# Patient Record
Sex: FEMALE | Race: BLACK OR AFRICAN AMERICAN | NOT HISPANIC OR LATINO | Employment: FULL TIME | URBAN - METROPOLITAN AREA
[De-identification: names, ages, dates, MRNs, and addresses within clinical notes are randomized per-mention and may not be internally consistent; named-entity substitution may affect disease eponyms.]

---

## 2021-08-12 ENCOUNTER — OFFICE VISIT (OUTPATIENT)
Dept: URGENT CARE | Facility: URGENT CARE | Age: 31
End: 2021-08-12
Payer: COMMERCIAL

## 2021-08-12 VITALS
DIASTOLIC BLOOD PRESSURE: 74 MMHG | RESPIRATION RATE: 20 BRPM | HEART RATE: 89 BPM | OXYGEN SATURATION: 97 % | SYSTOLIC BLOOD PRESSURE: 114 MMHG | TEMPERATURE: 98.1 F | WEIGHT: 160 LBS

## 2021-08-12 DIAGNOSIS — Z20.822 EXPOSURE TO COVID-19 VIRUS: ICD-10-CM

## 2021-08-12 DIAGNOSIS — J06.9 UPPER RESPIRATORY TRACT INFECTION, UNSPECIFIED TYPE: Primary | ICD-10-CM

## 2021-08-12 PROCEDURE — U0005 INFEC AGEN DETEC AMPLI PROBE: HCPCS | Performed by: PHYSICIAN ASSISTANT

## 2021-08-12 PROCEDURE — 99203 OFFICE O/P NEW LOW 30 MIN: CPT | Performed by: PHYSICIAN ASSISTANT

## 2021-08-12 PROCEDURE — U0003 INFECTIOUS AGENT DETECTION BY NUCLEIC ACID (DNA OR RNA); SEVERE ACUTE RESPIRATORY SYNDROME CORONAVIRUS 2 (SARS-COV-2) (CORONAVIRUS DISEASE [COVID-19]), AMPLIFIED PROBE TECHNIQUE, MAKING USE OF HIGH THROUGHPUT TECHNOLOGIES AS DESCRIBED BY CMS-2020-01-R: HCPCS | Performed by: PHYSICIAN ASSISTANT

## 2021-08-12 RX ORDER — BUSPIRONE HYDROCHLORIDE 15 MG/1
15 TABLET ORAL
COMMUNITY
Start: 2020-10-26 | End: 2022-02-03

## 2021-08-12 RX ORDER — DEXTROAMPHETAMINE SULFATE, DEXTROAMPHETAMINE SACCHARATE, AMPHETAMINE SULFATE AND AMPHETAMINE ASPARTATE 5; 5; 5; 5 MG/1; MG/1; MG/1; MG/1
CAPSULE, EXTENDED RELEASE ORAL
COMMUNITY
Start: 2021-07-30

## 2021-08-12 RX ORDER — DEXTROAMPHETAMINE SACCHARATE, AMPHETAMINE ASPARTATE, DEXTROAMPHETAMINE SULFATE AND AMPHETAMINE SULFATE 2.5; 2.5; 2.5; 2.5 MG/1; MG/1; MG/1; MG/1
TABLET ORAL
COMMUNITY
Start: 2020-11-07

## 2021-08-12 ASSESSMENT — ENCOUNTER SYMPTOMS
CARDIOVASCULAR NEGATIVE: 1
COUGH: 0
SORE THROAT: 0
SINUS PAIN: 0
WHEEZING: 0
FEVER: 0
SINUS PRESSURE: 0
CONSTITUTIONAL NEGATIVE: 1
CHILLS: 0
PALPITATIONS: 0
NAUSEA: 1
SHORTNESS OF BREATH: 0
RESPIRATORY NEGATIVE: 1
FATIGUE: 0
RHINORRHEA: 1

## 2021-08-12 ASSESSMENT — PAIN SCALES - GENERAL: PAINLEVEL: NO PAIN (0)

## 2021-08-12 NOTE — PROGRESS NOTES
Jc Sullivan is a 30 year old who presents for the following health issues   HPI   Acute Illness  Acute illness concerns:   Onset/Duration: 2days  Symptoms:  Fever: no  Chills/Sweats: no  Headache (location?): no  Sinus Pressure: no  Conjunctivitis:  no  Ear Pain: no  Rhinorrhea: YES  Congestion: no  Sore Throat: no  Cough: no  Wheeze: no  Decreased Appetite: no  Nausea: YES  Vomiting: no  Diarrhea: no  Dysuria/Freq.: no  Dysuria or Hematuria: no  Fatigue/Achiness: no  Sick/Strep Exposure: YES- exposure to covid from daughter  Therapies tried and outcome: rest, fluids with minimal relief  There is no problem list on file for this patient.    Current Outpatient Medications   Medication     ADDERALL XR 20 MG 24 hr capsule     amphetamine-dextroamphetamine (ADDERALL) 10 MG tablet     busPIRone (BUSPAR) 15 MG tablet     No current facility-administered medications for this visit.        Allergies   Allergen Reactions     Seasonal Allergies        Review of Systems   Constitutional: Negative.  Negative for chills, fatigue and fever.   HENT: Positive for rhinorrhea. Negative for congestion, ear discharge, ear pain, hearing loss, sinus pressure, sinus pain and sore throat.    Respiratory: Negative.  Negative for cough, shortness of breath and wheezing.    Cardiovascular: Negative.  Negative for chest pain, palpitations and peripheral edema.   Gastrointestinal: Positive for nausea.   All other systems reviewed and are negative.           Objective    /74 (BP Location: Right arm, Patient Position: Sitting, Cuff Size: Adult Large)   Pulse 89   Temp 98.1  F (36.7  C) (Oral)   Resp 20   Wt 72.6 kg (160 lb)   LMP 08/02/2021 (Exact Date)   SpO2 97%   There is no height or weight on file to calculate BMI.  Physical Exam  Vitals and nursing note reviewed.   Constitutional:       General: She is not in acute distress.     Appearance: Normal appearance. She is well-developed. She is obese. She is not  ill-appearing.   HENT:      Head: Normocephalic and atraumatic.      Comments: TMs are intact without any erythema or bulging bilaterally.  Airway is patent.     Nose: Nose normal.      Mouth/Throat:      Mouth: Mucous membranes are moist.      Pharynx: Uvula midline. No pharyngeal swelling, oropharyngeal exudate or posterior oropharyngeal erythema.      Tonsils: No tonsillar exudate.   Eyes:      General: No scleral icterus.     Extraocular Movements: Extraocular movements intact.      Conjunctiva/sclera: Conjunctivae normal.      Pupils: Pupils are equal, round, and reactive to light.   Neck:      Thyroid: No thyromegaly.   Cardiovascular:      Rate and Rhythm: Normal rate and regular rhythm.      Pulses: Normal pulses.      Heart sounds: Normal heart sounds, S1 normal and S2 normal. No murmur heard.   No friction rub. No gallop.    Pulmonary:      Effort: Pulmonary effort is normal. No accessory muscle usage, respiratory distress or retractions.      Breath sounds: Normal breath sounds and air entry. No stridor. No decreased breath sounds, wheezing, rhonchi or rales.   Musculoskeletal:      Cervical back: Normal range of motion and neck supple.   Lymphadenopathy:      Cervical: No cervical adenopathy.   Skin:     General: Skin is warm and dry.      Nails: There is no clubbing.   Neurological:      Mental Status: She is alert and oriented to person, place, and time.   Psychiatric:         Mood and Affect: Mood normal.         Behavior: Behavior normal.         Thought Content: Thought content normal.         Judgment: Judgment normal.            Assessment/Plan:  Upper respiratory tract infection, unspecified type: Will test for COVID19.  Tylenol/ibuprofen as needed for pain/fever, rest, fluids, chicken soup.  Recommend self quarantine until it has been at least 10days since onset of symptoms with improvement and until s/he has been fever free for 3days without the use of anti-pyretics pending results.  To the  urgent care/ER if worsening cough, shortness of breath, wheezing, fevers or chest pain  -     Symptomatic COVID-19 Virus (Coronavirus) by PCR Nasopharyngeal    Exposure to COVID-19 virus        Domonique Peng PA-C

## 2021-08-13 LAB — SARS-COV-2 RNA RESP QL NAA+PROBE: NEGATIVE

## 2021-08-15 ENCOUNTER — OFFICE VISIT (OUTPATIENT)
Dept: URGENT CARE | Facility: URGENT CARE | Age: 31
End: 2021-08-15
Payer: COMMERCIAL

## 2021-08-15 VITALS
DIASTOLIC BLOOD PRESSURE: 74 MMHG | SYSTOLIC BLOOD PRESSURE: 114 MMHG | HEART RATE: 78 BPM | RESPIRATION RATE: 16 BRPM | OXYGEN SATURATION: 94 % | TEMPERATURE: 98.2 F

## 2021-08-15 DIAGNOSIS — R05.9 COUGH: Primary | ICD-10-CM

## 2021-08-15 PROCEDURE — U0003 INFECTIOUS AGENT DETECTION BY NUCLEIC ACID (DNA OR RNA); SEVERE ACUTE RESPIRATORY SYNDROME CORONAVIRUS 2 (SARS-COV-2) (CORONAVIRUS DISEASE [COVID-19]), AMPLIFIED PROBE TECHNIQUE, MAKING USE OF HIGH THROUGHPUT TECHNOLOGIES AS DESCRIBED BY CMS-2020-01-R: HCPCS | Performed by: NURSE PRACTITIONER

## 2021-08-15 PROCEDURE — 99214 OFFICE O/P EST MOD 30 MIN: CPT | Performed by: NURSE PRACTITIONER

## 2021-08-15 PROCEDURE — U0005 INFEC AGEN DETEC AMPLI PROBE: HCPCS | Performed by: NURSE PRACTITIONER

## 2021-08-15 ASSESSMENT — ENCOUNTER SYMPTOMS
FATIGUE: 1
DIARRHEA: 0
SHORTNESS OF BREATH: 0
HEADACHES: 0
COUGH: 1
NAUSEA: 0
SORE THROAT: 0
FEVER: 1
CHILLS: 0
VOMITING: 0
RHINORRHEA: 1

## 2021-08-15 ASSESSMENT — PAIN SCALES - GENERAL: PAINLEVEL: MILD PAIN (3)

## 2021-08-15 NOTE — LETTER
[facility] [address] [address]  Phone: [phone]    August 15, 2021        Laurie R Marquis  [address] [place] [id number]          To whom it may concern:    RE: Laurie Marquis    Patient was seen and treated today at our clinic. Please allow her to rest home 8/16/2021. Patient may return to work  with no restrictions.  Please contact me for questions or concerns.      Sincerely,        Snow Ellis  DNP, FNP-BC  Family Nurse Practitoner

## 2021-08-15 NOTE — PATIENT INSTRUCTIONS

## 2021-08-15 NOTE — PROGRESS NOTES
SUBJECTIVE:   Laurie Marquis is a 30 year old female presenting with a chief complaint of   Chief Complaint   Patient presents with     Covid Concern     Daughter was diagnosed on 8/11 positive with covid- patient has a small cough- loss of smell and taste- fatigue- chills- patient was tested for covid and hers came back negative- she would like to be retested.       She is an established patient of [facility].    URI Adult    Onset of symptoms was 3 day(s) ago.  Course of illness is worsening.    Severity moderate  Current and Associated symptoms: fever, runny nose, stuffy nose, cough sore throat, fatigue and loss of taste and smell  Treatment measures tried include None tried.  Predisposing factors include exposed to COVID        Review of Systems   Constitutional: Positive for fatigue and fever. Negative for chills.   HENT: Positive for congestion and rhinorrhea. Negative for ear pain and sore throat.         Loss of taste and smell   Respiratory: Positive for cough. Negative for shortness of breath.    Gastrointestinal: Negative for diarrhea, nausea and vomiting.   Neurological: Negative for headaches.   All other systems reviewed and are negative.      No past medical history on file.  No family history on file.  Current Outpatient Medications   Medication Sig Dispense Refill     ADDERALL XR 20 MG 24 hr capsule TAKE 1 CAPSULE BY MOUTH ONCE DAILY ALONG WITH 10MG REGULAR TABLET       busPIRone (BUSPAR) 15 MG tablet Take 15 mg by mouth       amphetamine-dextroamphetamine (ADDERALL) 10 MG tablet TAKE 1 TABLET BY MOUTH ONCE DAILY IN THE MORNING ALONG WITH 20 MG ER FORM       Social History     Tobacco Use     Smoking status: Never Smoker     Smokeless tobacco: Never Used   Substance Use Topics     Alcohol use: Yes       OBJECTIVE  /74   Pulse 78   Temp 98.2  F (36.8  C) (Tympanic)   Resp 16   LMP 08/02/2021 (Exact Date)   SpO2 94%     Physical Exam  Vitals and nursing note reviewed.   Constitutional:        General: She is not in acute distress.     Appearance: She is well-developed. She is not diaphoretic.   HENT:      Head: Normocephalic and atraumatic.      Right Ear: Tympanic membrane and external ear normal.      Left Ear: Tympanic membrane and external ear normal.   Eyes:      Pupils: Pupils are equal, round, and reactive to light.   Pulmonary:      Effort: Pulmonary effort is normal. No respiratory distress.      Breath sounds: Normal breath sounds.   Musculoskeletal:      Cervical back: Normal range of motion and neck supple.   Lymphadenopathy:      Cervical: No cervical adenopathy.   Skin:     General: Skin is warm and dry.   Neurological:      Mental Status: She is alert.      Cranial Nerves: No cranial nerve deficit.       ASSESSMENT:      ICD-10-CM    1. Cough  R05 Symptomatic COVID-19 Virus (Coronavirus) by PCR Nasopharyngeal        Medical Decision Making:    Differential Diagnosis:  URI Adult/Peds:  Allergic rhinitis, Pneumonia, Sinusitis, Viral pharyngitis, Viral upper respiratory illness and COVID    PLAN:  Full PPE to include gloves, masks, faceshield, gown and head cap  Laurie Marquis has been evaluated and needs to remain out of work to isolate for 10 days from when symptoms started AND 72 hours after fever resolves (without fever reducing medications) AND improvement of respiratory symptoms (whichever is longer).        Patient Instructions       Patient Education     Viral Upper Respiratory Illness (Adult)    You have a viral upper respiratory illness (URI), which is another term for the common cold. This illness is contagious during the first few days. It is spread through the air by coughing and sneezing. It may also be spread by direct contact (touching the sick person and then touching your own eyes, nose, or mouth). Frequent handwashing will decrease risk of spread. Most viral illnesses go away within 7 to 10 days with rest and simple home remedies. Sometimes the illness may last for several  weeks. Antibiotics will not kill a virus, and they are generally not prescribed for this condition.  Home care    If symptoms are severe, rest at home for the first 2 to 3 days. When you resume activity, don't let yourself get too tired.    Don't smoke. If you need help stopping, talk with your healthcare provider.    Avoid being exposed to cigarette smoke (yours or others ).    You may use acetaminophen or ibuprofen to control pain and fever, unless another medicine was prescribed. If you have chronic liver or kidney disease, have ever had a stomach ulcer or gastrointestinal bleeding, or are taking blood-thinning medicines, talk with your healthcare provider before using these medicines. Aspirin should never be given to anyone under 18 years of age who is ill with a viral infection or fever. It may cause severe liver or brain damage.    Your appetite may be poor, so a light diet is fine. Stay well hydrated by drinking 6 to 8 glasses of fluids per day (water, soft drinks, juices, tea, or soup). Extra fluids will help loosen secretions in the nose and lungs.    Over-the-counter cold medicines will not shorten the length of time you re sick, but they may be helpful for the following symptoms: cough, sore throat, and nasal and sinus congestion. If you take prescription medicines, ask your healthcare provider or pharmacist which over-the-counter medicines are safe to use. (Note: Don't use decongestants if you have high blood pressure.)  Follow-up care  Follow up with your healthcare provider, or as advised.  When to seek medical advice  Call your healthcare provider right away if any of these occur:    Cough with lots of colored sputum (mucus)    Severe headache; face, neck, or ear pain    Difficulty swallowing due to throat pain    Fever of 100.4 F (38 C) or higher, or as directed by your healthcare provider  Call 911  Call 911 if any of these occur:    Chest pain, shortness of breath, wheezing, or difficulty  breathing    Coughing up blood    Very severe pain with swallowing, especially if it goes along with a muffled voice   [employer] last reviewed this educational content on 6/1/2018 2000-2021 [employer]. All rights reserved. This information is not intended as a substitute for professional medical care. Always follow your healthcare professional's instructions.

## 2021-08-16 LAB — SARS-COV-2 RNA RESP QL NAA+PROBE: POSITIVE

## 2021-10-02 ENCOUNTER — HEALTH MAINTENANCE LETTER (OUTPATIENT)
Age: 31
End: 2021-10-02

## 2022-01-18 ENCOUNTER — OFFICE VISIT (OUTPATIENT)
Dept: URGENT CARE | Facility: URGENT CARE | Age: 32
End: 2022-01-18
Payer: COMMERCIAL

## 2022-01-18 VITALS
WEIGHT: 169.6 LBS | SYSTOLIC BLOOD PRESSURE: 105 MMHG | DIASTOLIC BLOOD PRESSURE: 73 MMHG | HEART RATE: 76 BPM | TEMPERATURE: 98.7 F | OXYGEN SATURATION: 98 %

## 2022-01-18 DIAGNOSIS — R07.0 THROAT PAIN: Primary | ICD-10-CM

## 2022-01-18 LAB
DEPRECATED S PYO AG THROAT QL EIA: NEGATIVE
GROUP A STREP BY PCR: NOT DETECTED

## 2022-01-18 PROCEDURE — 99000 SPECIMEN HANDLING OFFICE-LAB: CPT | Performed by: PHYSICIAN ASSISTANT

## 2022-01-18 PROCEDURE — U0003 INFECTIOUS AGENT DETECTION BY NUCLEIC ACID (DNA OR RNA); SEVERE ACUTE RESPIRATORY SYNDROME CORONAVIRUS 2 (SARS-COV-2) (CORONAVIRUS DISEASE [COVID-19]), AMPLIFIED PROBE TECHNIQUE, MAKING USE OF HIGH THROUGHPUT TECHNOLOGIES AS DESCRIBED BY CMS-2020-01-R: HCPCS | Mod: 90 | Performed by: PHYSICIAN ASSISTANT

## 2022-01-18 PROCEDURE — 99213 OFFICE O/P EST LOW 20 MIN: CPT | Performed by: PHYSICIAN ASSISTANT

## 2022-01-18 PROCEDURE — U0005 INFEC AGEN DETEC AMPLI PROBE: HCPCS | Mod: 90 | Performed by: PHYSICIAN ASSISTANT

## 2022-01-18 PROCEDURE — 87651 STREP A DNA AMP PROBE: CPT | Performed by: PHYSICIAN ASSISTANT

## 2022-01-18 NOTE — PROGRESS NOTES
Chief Complaint   Patient presents with     Sick     Having allergy symtoms lately, woke up today, fatigued, sore throat, back pain, chills. Did covid test at home-negative.     Results for orders placed or performed in visit on 01/18/22   Streptococcus A Rapid Screen w/Reflex to PCR - Clinic Collect     Status: Normal    Specimen: Throat; Swab   Result Value Ref Range    Group A Strep antigen Negative Negative         ASSESSMENT:     ICD-10-CM    1. Throat pain  R07.0 Streptococcus A Rapid Screen w/Reflex to PCR - Clinic Collect     Symptomatic; Yes; 1/17/2022 COVID-19 Virus (Coronavirus) by PCR Nose         PLAN:VSS. Covid pending.  I have discussed clinical findings with patient.  Symptomatic care is discussed.  I have discussed the possibility of  worsening symptoms and indication to RTC or go to the ER if they occur.  All questions are answered, patient indicates understanding of these issues and is in agreement with plan.   Patient care instructions are discussed/given at the end of visit.   Lots of rest and fluids.      Ariela Cope PA-C      SUBJECTIVE:  31-year-old female with some allergy symptoms over the last several days but last night acute onset of sore throat, fatigue, back pain, chills.  Had a negative home COVID test.  No vomiting or diarrhea.  No significant cough.      Allergies   Allergen Reactions     Seasonal Allergies        No past medical history on file.    ADDERALL XR 20 MG 24 hr capsule, TAKE 1 CAPSULE BY MOUTH ONCE DAILY ALONG WITH 10MG REGULAR TABLET  amphetamine-dextroamphetamine (ADDERALL) 10 MG tablet, TAKE 1 TABLET BY MOUTH ONCE DAILY IN THE MORNING ALONG WITH 20 MG ER FORM  busPIRone (BUSPAR) 15 MG tablet, Take 15 mg by mouth (Patient not taking: Reported on 1/18/2022)    No current facility-administered medications on file prior to visit.      Social History     Tobacco Use     Smoking status: Never Smoker     Smokeless tobacco: Never Used   Substance Use Topics      Alcohol use: Yes       ROS:  CONSTITUTIONAL: Negative for fatigue or fever.  EYES: Negative for eye problems.  ENT: As above.  RESP: As above.  CV: Negative for chest pains.  GI: Negative for vomiting.  MUSCULOSKELETAL:  Negative for significant muscle or joint pains.  NEUROLOGIC: Negative for headaches.  SKIN: Negative for rash.  PSYCH: Normal mentation for age.    OBJECTIVE:  /73   Pulse 76   Temp 98.7  F (37.1  C) (Tympanic)   Wt 76.9 kg (169 lb 9.6 oz)   SpO2 98%   GENERAL APPEARANCE: Healthy, alert and no distress.  EYES:Conjunctiva/sclera clear.  EARS: No cerumen.   Ear canals w/o erythema.  TM's intact w/o erythema.    NOSE/MOUTH: Nose without ulcers, erythema or lesions.  SINUSES: No maxillary sinus tenderness.  THROAT: Mild  erythema w/o tonsillar enlargement . No exudates.  NECK: Supple, nontender, no lymphadenopathy.  RESP: Lungs clear to auscultation - no rales, rhonchi or wheezes  CV: Regular rate and rhythm, normal S1 S2, no murmur noted.  NEURO: Awake, alert    SKIN: No rashes        Ariela Cope PA-C

## 2022-01-19 LAB
SARS-COV-2 RNA RESP QL NAA+PROBE: NORMAL
SARS-COV-2 RNA RESP QL NAA+PROBE: NOT DETECTED

## 2022-02-03 ENCOUNTER — OFFICE VISIT (OUTPATIENT)
Dept: FAMILY MEDICINE | Facility: CLINIC | Age: 32
End: 2022-02-03
Payer: COMMERCIAL

## 2022-02-03 VITALS
OXYGEN SATURATION: 98 % | HEART RATE: 76 BPM | TEMPERATURE: 97.9 F | SYSTOLIC BLOOD PRESSURE: 118 MMHG | DIASTOLIC BLOOD PRESSURE: 77 MMHG | WEIGHT: 171.2 LBS | RESPIRATION RATE: 16 BRPM

## 2022-02-03 DIAGNOSIS — Z11.59 NEED FOR HEPATITIS C SCREENING TEST: ICD-10-CM

## 2022-02-03 DIAGNOSIS — Z11.4 SCREENING FOR HIV (HUMAN IMMUNODEFICIENCY VIRUS): ICD-10-CM

## 2022-02-03 DIAGNOSIS — Z30.46 ENCOUNTER FOR REMOVAL AND REINSERTION OF NEXPLANON: Primary | ICD-10-CM

## 2022-02-03 PROBLEM — Z98.84 S/P LAPAROSCOPIC SLEEVE GASTRECTOMY: Status: ACTIVE | Noted: 2017-04-26

## 2022-02-03 PROBLEM — L30.1 DYSHIDROTIC ECZEMA: Status: ACTIVE | Noted: 2018-11-23

## 2022-02-03 PROBLEM — K62.5 BRBPR (BRIGHT RED BLOOD PER RECTUM): Status: ACTIVE | Noted: 2018-11-23

## 2022-02-03 PROBLEM — N94.6 DYSMENORRHEA: Status: ACTIVE | Noted: 2018-11-23

## 2022-02-03 PROBLEM — F41.8 DEPRESSION WITH ANXIETY: Status: ACTIVE | Noted: 2018-11-23

## 2022-02-03 PROCEDURE — 99207 PR DROP WITH A PROCEDURE: CPT | Performed by: NURSE PRACTITIONER

## 2022-02-03 PROCEDURE — 11983 REMOVE/INSERT DRUG IMPLANT: CPT | Performed by: NURSE PRACTITIONER

## 2022-02-03 ASSESSMENT — PAIN SCALES - GENERAL: PAINLEVEL: NO PAIN (0)

## 2022-02-03 NOTE — PATIENT INSTRUCTIONS
Patient Education     We placed the Nexplanon today.  Congratulations on choosing such an effective birth control!  -Use condoms for the next 7 days, after that, you are protected against pregnancy.  You should still use condoms to protect against  sexually transmitted infections.  -Remove the gauze dressing tomorrow, you can shower normally at that time.  -The little white bandaids will fall off after about one week.  This is fine.  -There should be minimal scarring; if you have any redness, tenderness, or drainage from the site, call right away.  -Remember to expect irregular bleeding, this is a normal side effect of Nexplanon  -Call with any questions or concerns!    Nexplanon Drug Implant 68 mg  Uses  For birth control.  Instructions  Two bandages will cover the area where the porsha is placed. Leave the larger, outer bandage on for 24 hours. Leave the smaller bandage in place for 3-5 days, as instructed by your doctor.  Keep the bandages clean and dry.  This medicine is normally placed under the skin of the upper arm, usually in the arm you do not write with.  A negative pregnancy test may be needed before receiving this medicine.  A second form of birth control may be needed for the first week after the porsha is placed. Ask your doctor or pharmacist about the need for back-up birth control.  Keep the card that includes the date and place the porsha was inserted.  The porsha must be removed after 3 years.  This medicine may cause dark patches to appear on your face. Avoid sunlight and use sunscreen lotion to minimize further darkening of these skin patches.  Avoid prolonged or excessive sunlight exposure. Use sunscreen lotion with SPF 15 or higher.  Please tell your doctor and pharmacist about all the medicines you take. Include both prescription and over-the-counter medicines. Also tell them about any vitamins, herbal medicines, or anything else you take for your health.  It is very important that you follow your  doctor's instructions for all blood tests.  It is very important that you keep all appointments for medical exams and tests while on this medicine.  Cautions  Some patients taking this medicine have experienced serious side effects. Please speak with your doctor to understand the risks and benefits associated with this medicine.  This medicine is associated with an increased risk for serious blood clots. Speak with your doctor about the benefits and risks from using this medicine.  This medicine is associated with an increased risk of serious heart problems, heart attack, and stroke. Please speak with your doctor about the risks and benefits of using this medicine. Contact your doctor immediately if you experience chest pain or difficulty breathing.  Tell your doctor and pharmacist if you ever had an allergic reaction to a medicine. Symptoms of an allergic reaction can include trouble breathing, skin rash, itching, swelling, or severe dizziness.  This medicine may not work as well in women who are very overweight. Speak to your doctor about any need to reduce weight.  Your ability to stay alert or to react quickly may be impaired by this medicine. Do not drive or operate machinery until you know how this medicine will affect you.  Please check with your doctor before drinking alcohol while on this medicine.  Avoid smoking while on this medicine. Smoking may increase your risk for stroke, heart attack, blood clots, high blood pressure, and other diseases of the heart and blood vessels.  Ask your doctor to show you how to perform a self breast examination. You should check your breasts once a month and report any changes to your doctor.  Talk to your doctor about getting a complete physical exam every year while on this medicine.  Check regularly to make sure you can feel the porsha under the skin. Contact your doctor right away if you can not feel it, or if it feels bent or broken.  Call the doctor if there are any  signs of confusion or unusual changes in behavior.  Tell the doctor or pharmacist if you are pregnant, planning to be pregnant, or breastfeeding.  Do not use this medicine if you are pregnant. If you become pregnant while on this medicine, contact your doctor immediately.  This medicine does not protect you or your partner against sexually transmitted diseases.  Do not take Anton Ruiz's wort while on this medicine.  Ask your pharmacist if this medicine can interact with any of your other medicines. Be sure to tell them about all the medicines you take.  Please tell all your doctors and dentists that you are on this medicine before they provide care.  Do not start or stop any other medicines without first speaking to your doctor or pharmacist.  Seek medical attention if you see any signs of a serious infection. These signs include pain, increasing redness or pus where this medicine is being used.  Side Effects  The following is a list of some common side effects from this medicine. Please speak with your doctor about what you should do if you experience these or other side effects.    acne    bloating    breast pain or swelling    dizziness    hair loss    headaches    high blood pressure    brown colored patches on the face    nausea    stomach upset or abdominal pain    vaginal bleeding or spotting between periods    vaginal itching or discharge    weight gain  Call your doctor or get medical help right away if you notice any of these more serious side effects:    increased risk of a blood clot    chest pain    confusion    coughing up blood or vomit that looks like coffee grounds    depression or feeling sad    fainting    severe or persistent headache    fast or irregular heart beats    jaw pain    sudden leg pain, swelling, warmth or redness    mood changes    shortness of breath    stomach pain    symptoms of stroke (such as one-sided weakness, slurred speech, confusion)    sweating    unusual or unexplained  tiredness or weakness    dark urine    cramping of the uterus or bleeding from the vagina    blurring or changes of vision    pain, heat, swelling or redness at the incision site    yellowing of the eyes    yellowing of the skin  A few people may have an allergic reactions to this medicine. Symptoms can include difficulty breathing, skin rash, itching, swelling, or severe dizziness. If you notice any of these symptoms, seek medical help quickly.  Extra  Please speak with your doctor, nurse, or pharmacist if you have any questions about this medicine.  [email or web address]  IMPORTANT NOTE: This document tells you briefly how to take your medicine, but it does not tell you all there is to know about it.Your doctor or pharmacist may give you other documents about your medicine. Please talk to them if you have any questions.Always follow their advice. There is a more complete description of this medicine available in English.Scan this code on your smartphone or tablet or use the web address below. You can also ask your pharmacist for a printout. If you have any questions, please ask your pharmacist.     2021 [employer].

## 2022-02-03 NOTE — PROGRESS NOTES
Assessment & Plan     Encounter for removal and reinsertion of Nexplanon  Procedure Note - Etonogestrel Implant Replacement    HPI: Laurie Marquis is here for Nexplanon (etonogestrel implant) replacement  Indication: contraception  STI history:  none      Counselling and Consent:  Affirmation of informed consent was signed and scanned into the medical record. Risks, benefits and alternatives were discussed.   Instructed on use of condoms for STI prevention.  Patient's questions were elicited and answered.        Preoperative Diagnosis:  Nexplanon Replacement  Postoperative Diagnosis:  same     Technique:   Skin prep Betadine  Anesthesia 1% lidocaine, with epi  EBL:   minimal  Complications: No  Tolerance:  Pt tolerated procedure well and was in stable condition.     Pt was positioned on exam table with left arm flexed and externally rotated. Nexplanon device was palpated without difficulty.  Area was prepped with betadine.  Anesthesia provided at the insertion site and along insertion tracj, Etonogestrel implant was then removed without difficulty using forceps.  Etonogestrel implant was then inserted subdermally in usual fashion below biceps/triceps sulcus about 8 cm from medial epicondyle . Provider and patient confirmed placement by palpating the device. Pressure dressing applied and procedure complete.     Follow up:  Pt was instructed to call if bleeding, severe pain or foul smell.  Instructed to remove pressure dressing after 24 hours, then may keep insertion site covered with a bandaid until it is healed.  Instructed that she requires removal or replacement of the device in 3 years.      Follow-up as needed.       Screening for HIV (human immunodeficiency virus)    - HIV Antigen Antibody Combo    Need for hepatitis C screening test    - Hepatitis C Screen Reflex to HCV RNA Quant and Genotype      Prescription drug management  22  minutes spent on the date of the encounter doing chart review, history and  exam, documentation and further activities per the note       Work on weight loss  Regular exercise  See Patient Instructions    No follow-ups on file.    DEANA Wu CNP  [facility] BRIAN Sullivan is a 31 year old who presents for the following health issues   Nexplanon removal or replacement    HPI     Patient is here for Nexplanon removal and replacement.  It was placed by [facility] on 11.26/18. LAST MENSTUAL PERIOD 1/31/22. She is [id number], last pap 12/31/19 NIL at [facility].        Review of Systems   Constitutional, HEENT, cardiovascular, pulmonary, gi and gu systems are negative, except as otherwise noted.      Objective    /77 (BP Location: Left arm, Patient Position: Chair, Cuff Size: Adult Regular)   Pulse 76   Temp 97.9  F (36.6  C) (Tympanic)   Resp 16   Wt 77.7 kg (171 lb 3.2 oz)   SpO2 98%   There is no height or weight on file to calculate BMI.  Physical Exam   GENERAL: healthy, alert and no distress  NECK: no adenopathy, no asymmetry, masses, or scars and thyroid normal to palpation  RESP: lungs clear to auscultation - no rales, rhonchi or wheezes  CV: regular rate and rhythm, normal S1 S2, no S3 or S4, no murmur, click or rub, no peripheral edema and peripheral pulses strong  ABDOMEN: soft, nontender, no hepatosplenomegaly, no masses and bowel sounds normal  MS: no gross musculoskeletal defects noted, no edema  SKIN: no suspicious lesions or rashes  NEURO: Normal strength and tone, mentation intact and speech normal  PSYCH: mentation appears normal, affect normal/bright  LYMPH: no cervical adenopathy

## 2022-09-03 ENCOUNTER — HEALTH MAINTENANCE LETTER (OUTPATIENT)
Age: 32
End: 2022-09-03

## 2022-11-19 ENCOUNTER — E-VISIT (OUTPATIENT)
Dept: URGENT CARE | Facility: CLINIC | Age: 32
End: 2022-11-19
Payer: COMMERCIAL

## 2022-11-19 DIAGNOSIS — N76.0 BACTERIAL VAGINOSIS: Primary | ICD-10-CM

## 2022-11-19 DIAGNOSIS — B96.89 BACTERIAL VAGINOSIS: Primary | ICD-10-CM

## 2022-11-19 PROCEDURE — 99421 OL DIG E/M SVC 5-10 MIN: CPT | Performed by: NURSE PRACTITIONER

## 2022-11-19 RX ORDER — METRONIDAZOLE 500 MG/1
500 TABLET ORAL 2 TIMES DAILY
Qty: 14 TABLET | Refills: 0 | Status: SHIPPED | OUTPATIENT
Start: 2022-11-19 | End: 2022-11-26

## 2022-11-19 NOTE — PATIENT INSTRUCTIONS
Thank you for choosing us for your care. I have placed an order for a prescription so that you can start treatment. View your full visit summary for details by clicking on the link below. Your pharmacist will able to address any questions you may have about the medication.     If you re not feeling better within 2-3 days, please schedule an appointment.  You can schedule an appointment right here in [facility], or call [phone]  If the visit is for the same symptoms as your eVisit, we ll refund the cost of your eVisit if seen within seven days.      Bacterial Vaginosis    You have a vaginal infection called bacterial vaginosis (BV). Both good and bad bacteria are present in a healthy vagina. BV occurs when these bacteria get out of balance. The number of bad bacteria increase. And the number of good bacteria decrease. BV is linked with sexual activity, but it's not a sexually transmitted infection (STI).   BV may or may not cause symptoms. If symptoms do occur, they can include:     Thin, gray, milky-white, or sometimes green discharge    Unpleasant odor or  fishy  smell    Itching, burning, or pain in or around the vagina  It is not known what causes BV, but certain factors can make the problem more likely. These can include:     Douching    Spermicides    Use of antibiotics    Change in hormone levels with pregnancy, breastfeeding, or menopause    Having sex with a new partner    Having sex with more than one partner  BV will sometimes go away on its own. But treatment is often advised. This is because untreated BV can raise the risk of more serious health problems such as:     Pelvic inflammatory disease (PID)     delivery (giving birth to a baby early if you re pregnant)    HIV and some other sexually transmitted infections (STIs)    Infection after surgery on the reproductive organs  Home care  General care    BV is most often treated with medicines called antibiotics. These may be given as pills or  as a vaginal cream. If antibiotics are prescribed, be sure to use them exactly as directed. And complete all of the medicine, even if your symptoms go away.    Don't douche or having sex during treatment.    If you have sex with a female partner, ask your healthcare provider if she should also be treated.  Prevention    Don't douche.    Don't have sex. If you do have sex, then take steps to lower your risk:  ? Use condoms when having sex.  ? Limit the number of sex partners you have.    Follow-up care  Follow up with your healthcare provider, or as advised.   When to get medical advice  Call your healthcare provider right away if:     You have a fever of 100.4 F (38 C) or higher, or as directed by your provider.    Your symptoms get worse, or they don t go away within a few days of starting treatment.    You have new pain in the lower belly or pelvic region.    You have side effects that bother you or a reaction to the pills or cream you re prescribed.    You or any of your sex partners have new symptoms, such as a rash, joint pain, or sores.  [employer] last reviewed this educational content on 6/1/2020 2000-2021 [employer]. All rights reserved. This information is not intended as a substitute for professional medical care. Always follow your healthcare professional's instructions.

## 2023-01-15 ENCOUNTER — HEALTH MAINTENANCE LETTER (OUTPATIENT)
Age: 33
End: 2023-01-15

## 2023-05-18 ENCOUNTER — OFFICE VISIT (OUTPATIENT)
Dept: URGENT CARE | Facility: URGENT CARE | Age: 33
End: 2023-05-18
Payer: COMMERCIAL

## 2023-05-18 VITALS
WEIGHT: 183 LBS | TEMPERATURE: 98.2 F | SYSTOLIC BLOOD PRESSURE: 116 MMHG | RESPIRATION RATE: 17 BRPM | HEART RATE: 76 BPM | OXYGEN SATURATION: 98 % | DIASTOLIC BLOOD PRESSURE: 70 MMHG

## 2023-05-18 DIAGNOSIS — H57.9 ALLERGIC EYE REACTION: Primary | ICD-10-CM

## 2023-05-18 PROCEDURE — 99213 OFFICE O/P EST LOW 20 MIN: CPT | Performed by: PHYSICIAN ASSISTANT

## 2023-05-18 RX ORDER — POLYMYXIN B SULFATE AND TRIMETHOPRIM 1; 10000 MG/ML; [USP'U]/ML
1-2 SOLUTION OPHTHALMIC 4 TIMES DAILY
Qty: 10 ML | Refills: 0 | Status: SHIPPED | OUTPATIENT
Start: 2023-05-18 | End: 2023-05-25

## 2023-05-18 RX ORDER — CETIRIZINE HYDROCHLORIDE 10 MG/1
10 TABLET ORAL DAILY
Qty: 30 TABLET | Refills: 0 | Status: SHIPPED | OUTPATIENT
Start: 2023-05-18

## 2023-05-18 ASSESSMENT — ENCOUNTER SYMPTOMS
RESPIRATORY NEGATIVE: 1
CARDIOVASCULAR NEGATIVE: 1
SINUS PRESSURE: 0
GASTROINTESTINAL NEGATIVE: 1
EYE ITCHING: 0
CHILLS: 0
PHOTOPHOBIA: 0
CONSTITUTIONAL NEGATIVE: 1
FEVER: 0
EYE DISCHARGE: 1
SINUS PAIN: 0
COUGH: 0
PALPITATIONS: 0
SHORTNESS OF BREATH: 0
SORE THROAT: 0
RHINORRHEA: 0
EYE REDNESS: 1
WHEEZING: 0
FATIGUE: 0
EYE PAIN: 1

## 2023-05-18 ASSESSMENT — VISUAL ACUITY: OU: 1

## 2023-05-18 NOTE — PROGRESS NOTES
Jc Sullivan is a 32 year old, presenting for the following health issues:  Eye Burning Both Eyes (Allergic reaction to adhesive no itching maybe needs some drops/)    HPI   Eye(s) Problem  Onset/Duration: 2days  Description:   Location: bilateral  Pain: YES- discomfort  Redness: YES  Accompanying Signs & Symptoms:  Discharge/mattering: YES  Swelling: No  Visual changes: No  Fever: No  Nasal Congestion: No  Bothered by bright lights: No  History:  Trauma: No  Foreign body exposure: No  Wearing contacts: No  Precipitating or alleviating factors:  Symptoms developed after she had gotten new eyelash extensions put in yesterday.  She usually will get some redness and irritation from the eyelash extension but it has never been this bad before.  She was told that it may be due to the adhesive.  Therapies tried and outcome: warm compresses, visine eye drops,ibuprofen with minimal relief    Patient Active Problem List   Diagnosis     BRBPR (bright red blood per rectum)     Cervicalgia     Depression with anxiety     Dysmenorrhea     Dyshidrotic eczema     Person with feared complaint in whom no diagnosis was made     S/P laparoscopic sleeve gastrectomy     Current Outpatient Medications   Medication     ADDERALL XR 20 MG 24 hr capsule     amphetamine-dextroamphetamine (ADDERALL) 10 MG tablet     multivitamin  peds with C and FA (FLINTSTONES/MY FIRST) CHEW     No current facility-administered medications for this visit.        Allergies   Allergen Reactions     Lactose Other (See Comments), Diarrhea and GI Disturbance            Seasonal Allergies      Review of Systems   Constitutional: Negative.  Negative for chills, fatigue and fever.   HENT: Negative for congestion, ear discharge, ear pain, hearing loss, rhinorrhea, sinus pressure, sinus pain and sore throat.    Eyes: Positive for pain, discharge and redness. Negative for photophobia, itching and visual disturbance.   Respiratory: Negative.  Negative for cough,  shortness of breath and wheezing.    Cardiovascular: Negative.  Negative for chest pain, palpitations and peripheral edema.   Gastrointestinal: Negative.    Allergic/Immunologic: Negative for environmental allergies.   All other systems reviewed and are negative.           Objective    /70   Pulse 76   Temp 98.2  F (36.8  C) (Tympanic)   Resp 17   Wt 83 kg (183 lb)   SpO2 98%   There is no height or weight on file to calculate BMI.  Physical Exam  Vitals and nursing note reviewed.   Constitutional:       General: She is not in acute distress.     Appearance: Normal appearance. She is well-developed and normal weight. She is not ill-appearing.   HENT:      Head: Normocephalic and atraumatic.      Ears:      Comments: TMs are intact without any erythema or bulging bilaterally.  Airway is patent.     Nose: Nose normal.      Mouth/Throat:      Lips: Pink.      Mouth: Mucous membranes are moist.      Pharynx: Oropharynx is clear. Uvula midline. No pharyngeal swelling, oropharyngeal exudate or posterior oropharyngeal erythema.      Tonsils: No tonsillar exudate.   Eyes:      General: Lids are normal. Lids are everted, no foreign bodies appreciated. Vision grossly intact. Gaze aligned appropriately. No scleral icterus.        Right eye: No foreign body or discharge.         Left eye: Discharge present.No foreign body.      Extraocular Movements: Extraocular movements intact.      Conjunctiva/sclera:      Right eye: Right conjunctiva is injected.      Left eye: Left conjunctiva is injected.      Pupils: Pupils are equal, round, and reactive to light.      Right eye: No fluorescein uptake.      Left eye: No fluorescein uptake.   Neck:      Thyroid: No thyromegaly.   Cardiovascular:      Rate and Rhythm: Normal rate and regular rhythm.      Pulses: Normal pulses.      Heart sounds: Normal heart sounds, S1 normal and S2 normal. No murmur heard.     No friction rub. No gallop.   Pulmonary:      Effort: Pulmonary  effort is normal. No accessory muscle usage, respiratory distress or retractions.      Breath sounds: Normal breath sounds and air entry. No decreased breath sounds, wheezing, rhonchi or rales.   Musculoskeletal:      Cervical back: Normal range of motion and neck supple.   Lymphadenopathy:      Cervical: No cervical adenopathy.   Skin:     General: Skin is warm and dry.      Findings: No rash.   Neurological:      Mental Status: She is alert and oriented to person, place, and time.   Psychiatric:         Mood and Affect: Mood normal.         Behavior: Behavior normal.         Thought Content: Thought content normal.         Judgment: Judgment normal.          Assessment/Plan:  Allergic eye reaction:  Will treat with zyrtec and polytrim eye [id number] to cover for an abrasion.  Continue with warm compresses and frequent hand washing.  She is unable to remove the eyelash extensions.  Tylenol/motrin as needed for pain/fever.  Will also send to eye specialist if no improvement.  Recheck in clinic if symptoms worsen or if symptoms do not improve.  -     cetirizine (ZYRTEC) 10 MG tablet; Take 1 tablet (10 mg) by mouth daily  -     trimethoprim-polymyxin b (POLYTRIM) 10562-7.1 UNIT/ML-% ophthalmic solution; Place 1-2 drops into both eyes 4 times daily for 7 days  -     Adult Eye  Referral        Domonique Peng PA-C

## 2023-07-25 ENCOUNTER — TELEPHONE (OUTPATIENT)
Dept: FAMILY MEDICINE | Facility: CLINIC | Age: 33
End: 2023-07-25

## 2023-07-25 ENCOUNTER — OFFICE VISIT (OUTPATIENT)
Dept: URGENT CARE | Facility: URGENT CARE | Age: 33
End: 2023-07-25
Payer: COMMERCIAL

## 2023-07-25 VITALS
TEMPERATURE: 99.4 F | DIASTOLIC BLOOD PRESSURE: 80 MMHG | OXYGEN SATURATION: 99 % | HEART RATE: 106 BPM | WEIGHT: 189.4 LBS | SYSTOLIC BLOOD PRESSURE: 130 MMHG

## 2023-07-25 DIAGNOSIS — R22.1 NECK SWELLING: Primary | ICD-10-CM

## 2023-07-25 DIAGNOSIS — M54.2 NECK PAIN: ICD-10-CM

## 2023-07-25 PROCEDURE — 99214 OFFICE O/P EST MOD 30 MIN: CPT | Performed by: PHYSICIAN ASSISTANT

## 2023-07-25 NOTE — PROGRESS NOTES
Chief Complaint   Patient presents with    SWOLLEN GLANDS     Gland on he right side of face is swollen.  It was causing a burning sensation last night, which caused her to have some trouble with her hearing.                 ASSESSMENT:     ICD-10-CM    1. Neck swelling  R22.1       2. Neck pain  M54.2                 PLAN: Unclear etiology of intermittent right anterior neck itching, swelling sensation, pain-most recently with additional decreased R hearing and burning sensation of the right tongue.  Neck ultrasound to rule out mass, lymphadenopathy, abscess.  Referral to ENT for further evaluation and treatment.  I have discussed clinical findings with patient.  I have discussed the possibility of  worsening symptoms and indication to RTC or go to the ER if they occur.  All questions are answered, patient indicates understanding of these issues and is in agreement with plan.   Patient care instructions are discussed/given at the end of visit.       Ariela Cope PA-C      SUBJECTIVE:  32-year-old female presents for intermittent right anterior neck swelling and pain below the angle of the jaw for the past 9 months.  She states that she will develop some intense itching in that area and when she goes to scratch at them sometimes she will get some sudden sharp pain.  Last night it scared her as she also had decreased hearing out of the right ear and burning sensation right tongue with it.  She denies any history of TMJ.  Denies rash.  She denies any tooth pain.  No fever or chills.  Father with thymus cancer.      Allergies   Allergen Reactions    Lactose Other (See Comments), Diarrhea and GI Disturbance           Seasonal Allergies        No past medical history on file.    ADDERALL XR 20 MG 24 hr capsule, TAKE 1 CAPSULE BY MOUTH ONCE DAILY ALONG WITH 10MG REGULAR TABLET  amphetamine-dextroamphetamine (ADDERALL) 10 MG tablet, TAKE 1 TABLET BY MOUTH ONCE DAILY IN THE MORNING ALONG WITH 20 MG ER  FORM  multivitamin  peds with C and FA (FLINTSTONES/MY FIRST) CHEW, Take 2 tablets by mouth  cetirizine (ZYRTEC) 10 MG tablet, Take 1 tablet (10 mg) by mouth daily (Patient not taking: Reported on 7/25/2023)    No current facility-administered medications on file prior to visit.      Social History     Tobacco Use    Smoking status: Never    Smokeless tobacco: Never    Tobacco comments:     N/A   Substance Use Topics    Alcohol use: Yes     Comment: Socially       ROS:  CONSTITUTIONAL: Negative for fatigue or fever.  EYES: Negative for eye problems.  ENT: As above.  RESP: Neg for SOB.  CV: Negative for chest pains.  GI: Negative for vomiting.  MUSCULOSKELETAL:  Negative for significant muscle or joint pains.  NEUROLOGIC: Negative for headaches.  SKIN: Negative for rash.  PSYCH: Normal mentation for age.    OBJECTIVE:  /80 (BP Location: Left arm, Patient Position: Sitting, Cuff Size: Adult Regular)   Pulse 106   Temp 99.4  F (37.4  C) (Tympanic)   Wt 85.9 kg (189 lb 6.4 oz)   SpO2 99%   GENERAL APPEARANCE: Healthy, alert and no distress.  EYES:Conjunctiva/sclera clear.  EARS: No cerumen.   Ear canals w/o erythema.  TM's intact w/o erythema.    NOSE/MOUTH: Nose without ulcers, erythema or lesions.  SINUSES: No maxillary sinus tenderness.  THROAT: No erythema w/o tonsillar enlargement . No exudates.  NECK: Supple, nontender, no lymphadenopathy.  RESP: Lungs clear to auscultation - no rales, rhonchi or wheezes  CV: Regular rate and rhythm, normal S1 S2, no murmur noted.  NEURO: Awake, alert    SKIN: No rashes        Ariela Cope PA-C

## 2023-07-26 ENCOUNTER — ANCILLARY PROCEDURE (OUTPATIENT)
Dept: ULTRASOUND IMAGING | Facility: CLINIC | Age: 33
End: 2023-07-26
Attending: PHYSICIAN ASSISTANT
Payer: COMMERCIAL

## 2023-07-26 DIAGNOSIS — R22.1 NECK SWELLING: ICD-10-CM

## 2023-07-26 DIAGNOSIS — M54.2 NECK PAIN: ICD-10-CM

## 2023-07-26 PROCEDURE — 76536 US EXAM OF HEAD AND NECK: CPT

## 2023-07-26 NOTE — TELEPHONE ENCOUNTER
[facility]    Phone Message    May a detailed message be left on voicemail: yes     Reason for Call: Other: per protocols neck mass possible enlarged lymph node. Preference is [facility]. Please contact to schedule. Thank you      Action Taken: Other: ENT    Travel Screening: Not Applicable

## 2023-08-10 ENCOUNTER — OFFICE VISIT (OUTPATIENT)
Dept: OTOLARYNGOLOGY | Facility: CLINIC | Age: 33
End: 2023-08-10
Payer: COMMERCIAL

## 2023-08-10 VITALS — DIASTOLIC BLOOD PRESSURE: 81 MMHG | SYSTOLIC BLOOD PRESSURE: 126 MMHG | WEIGHT: 189 LBS | HEART RATE: 80 BPM

## 2023-08-10 DIAGNOSIS — M54.2 NECK PAIN: ICD-10-CM

## 2023-08-10 DIAGNOSIS — R22.1 NECK SWELLING: ICD-10-CM

## 2023-08-10 DIAGNOSIS — J30.9 ALLERGIC RHINITIS, UNSPECIFIED SEASONALITY, UNSPECIFIED TRIGGER: Primary | ICD-10-CM

## 2023-08-10 PROCEDURE — 99203 OFFICE O/P NEW LOW 30 MIN: CPT | Performed by: OTOLARYNGOLOGY

## 2023-08-10 NOTE — PROGRESS NOTES
"ENT Consultation    Laurie Marquis is a 32 year old female who is seen in consultation at the request of self.      History of Present Illness - Laurie Marquis is a 32 year old female presents for evaluation of right unusual sinus symptoms.  The symptoms started about 9 months ago.  Off-and-on she would have some itching under her right jaw only.  May last for up to an hour.  She will try and press on it massages and from time to time will get \"lump\" sensation under her jaw.  On couple occasions seems to some sensation of burning under her tongue only on the right side.  It happens couple times per week.  She also lately feels more allergic symptoms sneezing itchy throat more on the right side as well as some nasal congestion.  There is strong family history of allergies.  Patient has not been officially tested.    Past Medical History - No past medical history on file.    Current Medications -   Current Outpatient Medications:     ADDERALL XR 20 MG 24 hr capsule, TAKE 1 CAPSULE BY MOUTH ONCE DAILY ALONG WITH 10MG REGULAR TABLET, Disp: , Rfl:     amphetamine-dextroamphetamine (ADDERALL) 10 MG tablet, TAKE 1 TABLET BY MOUTH ONCE DAILY IN THE MORNING ALONG WITH 20 MG ER FORM, Disp: , Rfl:     multivitamin  peds with C and FA (FLINTSTONES/MY FIRST) CHEW, Take 2 tablets by mouth, Disp: , Rfl:     cetirizine (ZYRTEC) 10 MG tablet, Take 1 tablet (10 mg) by mouth daily (Patient not taking: Reported on 7/25/2023), Disp: 30 tablet, Rfl: 0    Allergies -   Allergies   Allergen Reactions    Lactose Other (See Comments), Diarrhea and GI Disturbance           Seasonal Allergies        Social History -   Social History     Socioeconomic History    Marital status: Single     Spouse name: None    Number of children: None    Years of education: None    Highest education level: None   Tobacco Use    Smoking status: Never    Smokeless tobacco: Never    Tobacco comments:     N/A   Substance and Sexual Activity    Alcohol use: Yes     " Comment: Socially    Drug use: Never    Sexual activity: Not Currently     Partners: Male     Birth control/protection: Implant   Other Topics Concern    Parent/sibling w/ CABG, MI or angioplasty before 65F 55M? No       Family History - No family history on file.    Review of Systems - As per HPI and PMHx, otherwise review of system review of the head and neck negative. Otherwise 10+ review systems negative.    Physical Exam  /81   Pulse 80   Wt 85.7 kg (189 lb)   BMI: There is no height or weight on file to calculate BMI.    General - The patient is well nourished and well developed, and appears to have good nutritional status.  Alert and oriented to person and place, answers questions and cooperates with examination appropriately.    SKIN - No suspicious lesions or rashes.  Respiration - No respiratory distress.  Head and Face - Normocephalic and atraumatic, with no gross asymmetry noted of the contour of the facial features.  The facial nerve is intact, with strong symmetric movements.    Voice and Breathing - The patient was breathing comfortably without the use of accessory muscles. The patients voice was clear and strong, and had appropriate pitch and quality.    Ears - Bilateral pinna and EACs with normal appearing overlying skin. Tympanic membrane intact with good mobility on pneumatic otoscopy bilaterally. Bony landmarks of the ossicular chain are normal. The tympanic membranes are normal in appearance. No retraction, perforation, or masses.  No fluid or purulence was seen in the external canal or the middle ear.     Eyes - Extraocular movements intact.  Sclera were not icteric or injected, conjunctiva were pink and moist.    Mouth - Examination of the oral cavity showed pink, healthy oral mucosa. No lesions or ulcerations noted.  The tongue was mobile and midline, and the dentition were in good condition.  Bimanual palpation of the submandibular glands demonstrated symmetrical glands on palpation  nontender soft    Throat - The walls of the oropharynx were smooth, pink, moist, symmetric, and had no lesions or ulcerations.  The tonsillar pillars and soft palate were symmetric.  The uvula was midline on elevation.    Neck - Normal midline excursion of the laryngotracheal complex during swallowing.  Full range of motion on passive movement.  Palpation of the occipital, submental, submandibular, internal jugular chain, and supraclavicular nodes did not demonstrate any abnormal lymph nodes or masses.  The carotid pulse was palpable bilaterally.  Palpation of the thyroid was soft and smooth, with no nodules or goiter appreciated.  The trachea was mobile and midline.    Nose - External contour is symmetric, no gross deflection or scars.  Nasal mucosa is pale boggy and moist with no abnormal mucus.  The septum was midline and non-obstructive, turbinates of normal size and position.  No polyps, masses, or purulence noted on examination.    Neuro - Nonfocal neuro exam is normal, CN 2 through 12 intact, normal gait and muscle tone.  Imaging has been done.  Focused soft tissue neck ultrasound     Comparisons: None.     HISTORY: Right anterior neck pain and itching. One episode of tongue  numbness and decreased hearing after massaging the area.     FINDINGS: Targeted ultrasound in the area of symptoms demonstrates the  normal-appearing submandibular gland and lymph nodes which are  symmetric when compared with the left neck. No suspicious finding.                                                                      IMPRESSION: Negative ultrasound of the area of symptoms in the right  neck.     Performed in clinic today:  No procedures preformed in clinic today      A/P - Laurie Marquis is a 32 year old female with what appears to be more of allergic process localized to the right side of the throat neck.  Would like further evaluation from allergy,  Therefore we will refer the patient to Dr. White.  Patient should  follow-up in a few months.      Justin Perrin MD

## 2023-08-10 NOTE — LETTER
"    8/10/2023         RE: Laurie Marquis  [address]  [facility] [address]        Dear Colleague,    Thank you for referring your patient, Laurie Marquis, to the [facility]. Please see a copy of my visit note below.    ENT Consultation    Laurie Marquis is a 32 year old female who is seen in consultation at the request of self.      History of Present Illness - Laurie Marquis is a 32 year old female presents for evaluation of right unusual sinus symptoms.  The symptoms started about 9 months ago.  Off-and-on she would have some itching under her right jaw only.  May last for up to an hour.  She will try and press on it massages and from time to time will get \"lump\" sensation under her jaw.  On couple occasions seems to some sensation of burning under her tongue only on the right side.  It happens couple times per week.  She also lately feels more allergic symptoms sneezing itchy throat more on the right side as well as some nasal congestion.  There is strong family history of allergies.  Patient has not been officially tested.    Past Medical History - No past medical history on file.    Current Medications -   Current Outpatient Medications:      ADDERALL XR 20 MG 24 hr capsule, TAKE 1 CAPSULE BY MOUTH ONCE DAILY ALONG WITH 10MG REGULAR TABLET, Disp: , Rfl:      amphetamine-dextroamphetamine (ADDERALL) 10 MG tablet, TAKE 1 TABLET BY MOUTH ONCE DAILY IN THE MORNING ALONG WITH 20 MG ER FORM, Disp: , Rfl:      multivitamin  peds with C and FA (FLINTSTONES/MY FIRST) CHEW, Take 2 tablets by mouth, Disp: , Rfl:      cetirizine (ZYRTEC) 10 MG tablet, Take 1 tablet (10 mg) by mouth daily (Patient not taking: Reported on 7/25/2023), Disp: 30 tablet, Rfl: 0    Allergies -   Allergies   Allergen Reactions     Lactose Other (See Comments), Diarrhea and GI Disturbance            Seasonal Allergies        Social History -   Social History     Socioeconomic History     Marital status: Single     " Spouse name: None     Number of children: None     Years of education: None     Highest education level: None   Tobacco Use     Smoking status: Never     Smokeless tobacco: Never     Tobacco comments:     N/A   Substance and Sexual Activity     Alcohol use: Yes     Comment: Socially     Drug use: Never     Sexual activity: Not Currently     Partners: Male     Birth control/protection: Implant   Other Topics Concern     Parent/sibling w/ CABG, MI or angioplasty before 65F 55M? No       Family History - No family history on file.    Review of Systems - As per HPI and PMHx, otherwise review of system review of the head and neck negative. Otherwise 10+ review systems negative.    Physical Exam  /81   Pulse 80   Wt 85.7 kg (189 lb)   BMI: There is no height or weight on file to calculate BMI.    General - The patient is well nourished and well developed, and appears to have good nutritional status.  Alert and oriented to person and place, answers questions and cooperates with examination appropriately.    SKIN - No suspicious lesions or rashes.  Respiration - No respiratory distress.  Head and Face - Normocephalic and atraumatic, with no gross asymmetry noted of the contour of the facial features.  The facial nerve is intact, with strong symmetric movements.    Voice and Breathing - The patient was breathing comfortably without the use of accessory muscles. The patients voice was clear and strong, and had appropriate pitch and quality.    Ears - Bilateral pinna and EACs with normal appearing overlying skin. Tympanic membrane intact with good mobility on pneumatic otoscopy bilaterally. Bony landmarks of the ossicular chain are normal. The tympanic membranes are normal in appearance. No retraction, perforation, or masses.  No fluid or purulence was seen in the external canal or the middle ear.     Eyes - Extraocular movements intact.  Sclera were not icteric or injected, conjunctiva were pink and moist.    Mouth -  Examination of the oral cavity showed pink, healthy oral mucosa. No lesions or ulcerations noted.  The tongue was mobile and midline, and the dentition were in good condition.  Bimanual palpation of the submandibular glands demonstrated symmetrical glands on palpation nontender soft    Throat - The walls of the oropharynx were smooth, pink, moist, symmetric, and had no lesions or ulcerations.  The tonsillar pillars and soft palate were symmetric.  The uvula was midline on elevation.    Neck - Normal midline excursion of the laryngotracheal complex during swallowing.  Full range of motion on passive movement.  Palpation of the occipital, submental, submandibular, internal jugular chain, and supraclavicular nodes did not demonstrate any abnormal lymph nodes or masses.  The carotid pulse was palpable bilaterally.  Palpation of the thyroid was soft and smooth, with no nodules or goiter appreciated.  The trachea was mobile and midline.    Nose - External contour is symmetric, no gross deflection or scars.  Nasal mucosa is pale boggy and moist with no abnormal mucus.  The septum was midline and non-obstructive, turbinates of normal size and position.  No polyps, masses, or purulence noted on examination.    Neuro - Nonfocal neuro exam is normal, CN 2 through 12 intact, normal gait and muscle tone.  Imaging has been done.  Focused soft tissue neck ultrasound     Comparisons: None.     HISTORY: Right anterior neck pain and itching. One episode of tongue  numbness and decreased hearing after massaging the area.     FINDINGS: Targeted ultrasound in the area of symptoms demonstrates the  normal-appearing submandibular gland and lymph nodes which are  symmetric when compared with the left neck. No suspicious finding.                                                                      IMPRESSION: Negative ultrasound of the area of symptoms in the right  neck.     Performed in clinic today:  No procedures preformed in clinic  today      A/P - Laurie Marquis is a 32 year old female with what appears to be more of allergic process localized to the right side of the throat neck.  Would like further evaluation from allergy,  Therefore we will refer the patient to Dr. White.  Patient should follow-up in a few months.      Justin Perrin MD       Again, thank you for allowing me to participate in the care of your patient.        Sincerely,        Justin Perrin MD, MD

## 2023-11-28 NOTE — PROGRESS NOTES
SUBJECTIVE:                                                                   Laurie Marquis presents today to our Allergy Clinic at [facility]; She is being seen in consultation at the request of Justin Perrin for an evaluation of ***, *** for a new patient visit. She is a 33 year old female with ***.  The {apaccompany:335371} accompanies the patient and helps to provide history.         Patient Active Problem List   Diagnosis    BRBPR (bright red blood per rectum)    Cervicalgia    Depression with anxiety    Dysmenorrhea    Dyshidrotic eczema    Person with feared complaint in whom no diagnosis was made    S/P laparoscopic sleeve gastrectomy       No past medical history on file.   No data available          No past surgical history on file.  Social History     Socioeconomic History    Marital status: Single   Tobacco Use    Smoking status: Never    Smokeless tobacco: Never    Tobacco comments:     N/A   Substance and Sexual Activity    Alcohol use: Yes     Comment: Socially    Drug use: Never    Sexual activity: Not Currently     Partners: Male     Birth control/protection: Implant   Other Topics Concern    Parent/sibling w/ CABG, MI or angioplasty before 65F 55M? No   Social History Narrative    ENVIRONMENTAL HISTORY: The family lives in a {apold/new:347467} home in a [place]989852} setting. The home is heated with a {HEATING SYSTEM:305310}. They {DOES/DOES NOT:614665} have central air conditioning. The patient's bedroom is furnished with {Environmental Factors:461147}.  Pets inside the house include {ANIMAL:054699:a}. There {IS / IS NO:922693} history of cockroach or mice infestation. There is/are {NUMBERS TO FIVE:482644:a} smokers in the house.  The house {DOES/DOES NOT:109665:a} have a damp basement.             Review of Systems        Current Outpatient Medications:     ADDERALL XR 20 MG 24 hr capsule, TAKE 1 CAPSULE BY MOUTH ONCE DAILY ALONG WITH 10MG REGULAR TABLET, Disp:  , Rfl:     amphetamine-dextroamphetamine (ADDERALL) 10 MG tablet, TAKE 1 TABLET BY MOUTH ONCE DAILY IN THE MORNING ALONG WITH 20 MG ER FORM, Disp: , Rfl:     cetirizine (ZYRTEC) 10 MG tablet, Take 1 tablet (10 mg) by mouth daily (Patient not taking: Reported on 7/25/2023), Disp: 30 tablet, Rfl: 0    multivitamin  peds with C and FA (FLINTSTONES/MY FIRST) CHEW, Take 2 tablets by mouth, Disp: , Rfl:   Immunization History   Administered Date(s) Administered    DTAP (<7y) 08/30/1996    Flu, Unspecified 10/15/2013    HPV Quadrivalent 11/13/2006, 03/24/2010, 10/04/2010    Hepatitis B, Peds 02/10/2000, 03/23/2000, 08/22/2000    Hib, Unspecified 01/17/1991, 03/15/1991, 09/23/1991, 01/10/1992    Historic Hib Hib-titer 01/17/1991, 03/15/1991, 09/23/1991, 01/10/1992    Historical DTP/aP 01/17/1991, 03/15/1991, 09/23/1991, 04/25/1992    Influenza (H1N1) 10/04/2010    Influenza (IIV3) PF 10/04/2010, 10/07/2013    Influenza Vaccine >6 months,quad, PF 11/19/2014    Influenza Vaccine, 6+MO IM (QUADRIVALENT W/PRESERVATIVES) 10/04/2010, 10/07/2013    Influenza, seasonal, injectable, PF 11/19/2014    MMR 01/10/1992, 02/10/2000    Meningococcal ACWY (Menactra ) 11/13/2006    Meningococcal ACWY (Menveo ) 11/13/2006    OPV, trivalent, live 01/17/1991, 03/15/1991, 04/24/1992, 08/30/1996    Poliovirus, inactivated (IPV) 09/23/1991, 01/10/1992    TDAP (Adacel,Boostrix) 02/08/2014    Td (Adult), Adsorbed 08/28/2003     Allergies   Allergen Reactions    Lactose Other (See Comments), Diarrhea and GI Disturbance           Seasonal Allergies      OBJECTIVE:                                                                 There were no vitals taken for this visit.        Physical Exam          WORKUP: {Allergy Evaluation:325303}    ASSESSMENT/PLAN:    There are no diagnoses linked to this encounter.     No follow-ups on file.    Thank you for allowing us to participate in the care of this patient. Please feel free to contact us if there are any  questions or concerns about the patient.    Disclaimer: This note consists of symbols derived from keyboarding, dictation and/or voice recognition software. As a result, there may be errors in the script that have gone undetected. Please consider this when interpreting information found in this chart.    Oli Sauceda MD, FAAAAI, FACAAI  Allergy and Asthma     [facility]

## 2023-11-28 NOTE — PATIENT INSTRUCTIONS
Get the bloodwork done.     -Start intranasal fluticasone (Flonase) 1-2 sprays in each nostril once daily.  -Start azelastine nasal spray, 2 sprays in each nostril twice daily as needed.   Try artificial tears over the counter.           Dr Sauceda Scheduling:  Allergy Appointment line: [phone]    All visits for food challenges, venom allergy testing, and medication/drug challenges MUST be scheduled through the allergy clinic nurse. Please send a demandmart message or call the allergy scheduling line and ask to speak with Dr Sauceda's team for scheduling these appointments. Appointments for these visits that are made through the schedulers or via demandmart may be cancelled or rescheduled.    Allergy Shot Room ([place]): [phone]    Pulmonary Function Scheduling:  [place] - [phone]  [facility] [phone]  [place] - [phone]     Prescription Assistance  If you need assistance with your prescriptions (cost, coverage, etc) please contact: [facility] [phone]

## 2023-11-29 ENCOUNTER — OFFICE VISIT (OUTPATIENT)
Dept: ALLERGY | Facility: OTHER | Age: 33
End: 2023-11-29
Attending: OTOLARYNGOLOGY
Payer: COMMERCIAL

## 2023-11-29 VITALS
OXYGEN SATURATION: 99 % | HEART RATE: 94 BPM | WEIGHT: 195 LBS | TEMPERATURE: 97.3 F | RESPIRATION RATE: 17 BRPM | SYSTOLIC BLOOD PRESSURE: 99 MMHG | DIASTOLIC BLOOD PRESSURE: 76 MMHG

## 2023-11-29 DIAGNOSIS — J30.0 CHRONIC VASOMOTOR RHINITIS: Primary | ICD-10-CM

## 2023-11-29 DIAGNOSIS — H10.89 OTHER CONJUNCTIVITIS OF BOTH EYES: ICD-10-CM

## 2023-11-29 PROCEDURE — 86003 ALLG SPEC IGE CRUDE XTRC EA: CPT | Performed by: ALLERGY & IMMUNOLOGY

## 2023-11-29 PROCEDURE — 99203 OFFICE O/P NEW LOW 30 MIN: CPT | Mod: 25 | Performed by: ALLERGY & IMMUNOLOGY

## 2023-11-29 PROCEDURE — 95004 PERQ TESTS W/ALRGNC XTRCS: CPT | Performed by: ALLERGY & IMMUNOLOGY

## 2023-11-29 PROCEDURE — 82785 ASSAY OF IGE: CPT | Performed by: ALLERGY & IMMUNOLOGY

## 2023-11-29 PROCEDURE — 36415 COLL VENOUS BLD VENIPUNCTURE: CPT | Performed by: ALLERGY & IMMUNOLOGY

## 2023-11-29 RX ORDER — FLUTICASONE PROPIONATE 50 MCG
2 SPRAY, SUSPENSION (ML) NASAL DAILY
Qty: 16 G | Refills: 3 | Status: SHIPPED | OUTPATIENT
Start: 2023-11-29

## 2023-11-29 RX ORDER — AZELASTINE 1 MG/ML
2 SPRAY, METERED NASAL 2 TIMES DAILY PRN
Qty: 30 ML | Refills: 3 | Status: SHIPPED | OUTPATIENT
Start: 2023-11-29

## 2023-11-29 ASSESSMENT — ENCOUNTER SYMPTOMS
SINUS PAIN: 1
SORE THROAT: 0
DYSURIA: 0
SINUS PRESSURE: 1
VOMITING: 0
BACK PAIN: 0
PALPITATIONS: 0
FEVER: 0
COUGH: 1
HEMATURIA: 0
COLOR CHANGE: 0
SHORTNESS OF BREATH: 0
ARTHRALGIAS: 0
EYE PAIN: 0
CHILLS: 0
ABDOMINAL PAIN: 0

## 2023-11-29 ASSESSMENT — PAIN SCALES - GENERAL: PAINLEVEL: NO PAIN (0)

## 2023-11-29 NOTE — PROGRESS NOTES
SUBJECTIVE:                                                                   Laurie Marquis is a 33 year old female who presents today to our Allergy Clinic at [facility]; She is being seen in consultation at the request of Dr. Justin Perrin for an evaluation of allergic rhinitis.     Allergy symptoms    The onset of symptoms: all her life. Used to be Spring and Fall. For the past 2-3 years, became perennial.     Manifested by nasal itch, clear to green rhinorrhea, stuffiness, and sneezing, postnasal drip and ocular symptoms (itching, redness, dryness, and watering).   She is not worse around dogs and cats. Dust exposure will make symptoms worse.    Intranasal steroids have not been used consistently. She tried and failed loratadine. Cetirizine was partially helpful.  Denies using intranasal decongestants.  The severity of symptoms varies from 4 to 10/10.   There is no history of PE tubes, sinus surgeries, or tonsillectomy/adenoidectomy.          Patient Active Problem List   Diagnosis    BRBPR (bright red blood per rectum)    Cervicalgia    Depression with anxiety    Dysmenorrhea    Dyshidrotic eczema    Person with feared complaint in whom no diagnosis was made    S/P laparoscopic sleeve gastrectomy       History reviewed. No pertinent past medical history.   No data available          History reviewed. No pertinent surgical history.  Social History     Socioeconomic History    Marital status: Single     Spouse name: None    Number of children: None    Years of education: None    Highest education level: None   Tobacco Use    Smoking status: Never    Smokeless tobacco: Never    Tobacco comments:     N/A   Substance and Sexual Activity    Alcohol use: Yes     Comment: Socially    Drug use: Never    Sexual activity: Not Currently     Partners: Male     Birth control/protection: Implant   Other Topics Concern    Parent/sibling w/ CABG, MI or angioplasty before 65F 55M? No   Social History  Narrative    ENVIRONMENTAL HISTORY: [place] The home is heated with a electric furnace. They does have central air conditioning. The patient's bedroom is furnished with carpeting in bedroom.  Pets inside the house include 0 . There is no history of cockroach or mice infestation. There is/are 0 smokers in the house.  The house does not have a damp basement.             Review of Systems   Constitutional:  Negative for chills and fever.   HENT:  Positive for congestion, sinus pressure and sinus pain. Negative for ear pain and sore throat.    Eyes:  Negative for pain and visual disturbance.   Respiratory:  Positive for cough. Negative for shortness of breath.    Cardiovascular:  Negative for chest pain and palpitations.   Gastrointestinal:  Negative for abdominal pain and vomiting.   Genitourinary:  Negative for dysuria and hematuria.   Musculoskeletal:  Negative for arthralgias and back pain.   Skin:  Negative for color change and rash.   All other systems reviewed and are negative.          Current Outpatient Medications:     ADDERALL XR 20 MG 24 hr capsule, TAKE 1 CAPSULE BY MOUTH ONCE DAILY ALONG WITH 10MG REGULAR TABLET, Disp: , Rfl:     amphetamine-dextroamphetamine (ADDERALL) 10 MG tablet, TAKE 1 TABLET BY MOUTH ONCE DAILY IN THE MORNING ALONG WITH 20 MG ER FORM, Disp: , Rfl:     azelastine (ASTELIN) 0.1 % nasal spray, Spray 2 sprays into both nostrils 2 times daily as needed for rhinitis, Disp: 30 mL, Rfl: 3    cetirizine (ZYRTEC) 10 MG tablet, Take 1 tablet (10 mg) by mouth daily, Disp: 30 tablet, Rfl: 0    fluticasone (FLONASE) 50 MCG/ACT nasal spray, Spray 2 sprays into both nostrils daily, Disp: 16 g, Rfl: 3    multivitamin  peds with C and FA (FLINTSTONES/MY FIRST) CHEW, Take 2 tablets by mouth, Disp: , Rfl:   Immunization History   Administered Date(s) Administered    DTAP (<7y) 08/30/1996    Flu, Unspecified 10/15/2013    HPV Quadrivalent 11/13/2006,  03/24/2010, 10/04/2010    Hepatitis B, Peds 02/10/2000, 03/23/2000, 08/22/2000    Hib, Unspecified 01/17/1991, 03/15/1991, 09/23/1991, 01/10/1992    Historic Hib Hib-titer 01/17/1991, 03/15/1991, 09/23/1991, 01/10/1992    Historical DTP/aP 01/17/1991, 03/15/1991, 09/23/1991, 04/25/1992    Influenza (H1N1) 10/04/2010    Influenza (IIV3) PF 10/04/2010, 10/07/2013    Influenza Vaccine >6 months,quad, PF 11/19/2014    Influenza Vaccine, 6+MO IM (QUADRIVALENT W/PRESERVATIVES) 10/04/2010, 10/07/2013    Influenza, seasonal, injectable, PF 11/19/2014    MMR 01/10/1992, 02/10/2000    Meningococcal ACWY (Menactra ) 11/13/2006    Meningococcal ACWY (Menveo ) 11/13/2006    OPV, trivalent, live 01/17/1991, 03/15/1991, 04/24/1992, 08/30/1996    Poliovirus, inactivated (IPV) 09/23/1991, 01/10/1992    TDAP (Adacel,Boostrix) 02/08/2014    Td (Adult), Adsorbed 08/28/2003     Allergies   Allergen Reactions    Lactose Other (See Comments), Diarrhea and GI Disturbance           Seasonal Allergies      OBJECTIVE:                                                                 BP 99/76   Pulse 94   Temp 97.3  F (36.3  C)   Resp 17   Wt 88.5 kg (195 lb)   SpO2 99%         Physical Exam  Vitals and nursing note reviewed.   Constitutional:       General: She is not in acute distress.     Appearance: She is not ill-appearing, toxic-appearing or diaphoretic.   HENT:      Head: Normocephalic and atraumatic.      Right Ear: Tympanic membrane, ear canal and external ear normal.      Left Ear: Tympanic membrane, ear canal and external ear normal.      Nose: Mucosal edema present. No rhinorrhea.      Right Turbinates: Enlarged.      Left Turbinates: Enlarged.      Mouth/Throat:      Lips: Pink.      Mouth: Mucous membranes are moist.      Pharynx: Oropharynx is clear. No pharyngeal swelling, oropharyngeal exudate, posterior oropharyngeal erythema or uvula swelling.   Eyes:      General:         Right eye: No discharge.         Left eye: No  discharge.      Conjunctiva/sclera: Conjunctivae normal.   Cardiovascular:      Rate and Rhythm: Normal rate and regular rhythm.      Heart sounds: Normal heart sounds. No murmur heard.  Pulmonary:      Effort: Pulmonary effort is normal. No respiratory distress.      Breath sounds: Normal breath sounds and air entry. No stridor, decreased air movement or transmitted upper airway sounds. No decreased breath sounds, wheezing, rhonchi or rales.   Musculoskeletal:         General: Normal range of motion.   Skin:     General: Skin is warm.      Capillary Refill: Capillary refill takes less than 2 seconds.      Findings: No rash.   Neurological:      Mental Status: She is alert and oriented to person, place, and time.   Psychiatric:         Mood and Affect: Mood normal.         Behavior: Behavior normal.               WORKUP:     At today's visit, the patient and I engaged in an informed consent discussion about allergy testing.  We discussed skin testing, blood testing, and the alternative of not undergoing any testing. The patient has a preference for skin testing. We then discussed the risks and benefits of skin testing.  The patient understands skin testing risks can include, but are not limited to, urticaria, angioedema, shortness of breath, and severe anaphylaxis.  The benefits include, but are not limited, to evaluation for allergens causing symptoms.  After answering the patient's questions they have agreed to proceed with skin testing.      ENVIRONMENTAL PERCUTANEOUS SKIN TESTING: ADULT      11/29/2023     4:00 PM   [facility]   Consent Y   Ordering Physician Sohail   Interpreting Physician Sohail   Testing Technician RADHA Linares   Location Back   Time start: 16:27   Time End: 16:42   Positive Control: Histatrol*ALK 1 mg/ml 4/20   Negative Control: 50% Glycerin 0   Cat Hair*ALK (10,000 BAU/ml) 0   AP Dog Hair/Dander (1:100 w/v) 0   Dust Mite p. 30,000 AU/ml 0   Dust Mite f. (30,000 AU/ml) 0   Jose  (W/F in millimeters) 0   Nelson Grass (100,000 BAU/mL) 0   Red Cedar (W/F in millimeters) 0   Maple/[place]ale (W/F in millimeters) 0   Hackberry (W/F in millimeters) 0   Foard (W/F in millimeters) 0   Valley *ALK (W/F in millimeters) 0   American Elm (W/F in millimeters) 0   Terrell (W/F in millimeters) 0   Black Blackstone (W/F in millimeters) 0   Birch Mix (W/F in millimeters) 0   Savannah (W/F in millimeters) 0   Oak (W/F in millimeters) 0   Cocklebur (W/F in millimeters) 0   Springfield (W/F in millimeters) 0   White [place]in (W/F in millimeters) 0   Careless (W/F in millimeters) 0   Nettle (W/F in millimeters) 0   English Plantain (W/F in millimeters) 0   Kochia (W/F in millimeters) 0   Lamb's Quarter (W/F in millimeters) 0   Marshelder (W/F in millimeters) 0   Ragweed Mix* ALK (W/F in millimeters) 0   Russian Thistle (W/F in millimeters) 0   Sagebrush/Mugwort (W/F in millimeters) 0   Sheep Sorrel (W/F in millimeters) 0   Feather Mix* ALK (W/F in millimeters) 0   Penicillium Mix (1:10 w/v) 0   Curvularia spicifera (1:10 w/v) 0   Epicoccum (1:10 w/v) 0   Aspergillus fumigatus (1:10 w/v): 0   Alternaria tenius (1:10 w/v) 0   H. Cladosporium (1:10 w/v) 0   Phoma herbarum (1:10 w/v) 0       My interpretation: Percutaneous skin puncture testing for aeroallergens performed today was negative with appropriate responses to positive and negative controls.     ASSESSMENT/PLAN:    Chronic vasomotor rhinitis  Other conjunctivitis of both eyes  Negative percutaneous skin puncture testing for aeroallergens suggesting a lack of sensitivity to tested environmental/seasonal allergens.  Unable to recommend avoidance measures or allergen immunotherapy.   Since we do not perform intradermal testing, I ordered serum IgE for the regional aeroallergen panel.  Discussed differential with chronic nonallergic rhinitis. Adderall causing rhinitis medicamentosa?  - Use intranasal fluticasone (Flonase) 1-2 sprays in each nostril once daily.  -  Add azelastine nasal spray, 2 sprays in each nostril twice daily as needed.  Depending on symptom control, we may consider ordering sinus CT and/or decreasing the dose of Adderall as a trial.   She admits having itchy, dry, and then watery eyes.  - Recommended a trial of artificial tears.    - Allergen cat epithellium IgE  - Allergen dog epithelium IgE  - Allergen Jose grass IgE  - Allergen orchard grass IgE  - Allergen mary alice IgE  - Allergen D farinae IgE  - Allergen D pteronyssinus IgE  - Allergen alternaria alternata IgE  - Allergen aspergillus fumigatus IgE  - Allergen cladosporium herbarum IgE  - Allergen Epicoccum purpurascens IgE  - Allergen penicillium notatum IgE  - Allergen wilber white IgE  - Allergen Cedar IgE  - Allergen cottonwood IgE  - Allergen elm IgE  - Allergen maple box elder IgE  - Allergen oak white IgE  - Allergen Red [place] IgE  - Allergen silver  birch IgE  - Allergen Tree White [place] IgE  - Allergen [place] Tree  - Allergen white pine IgE  - Allergen English plantain IgE  - Allergen giant ragweed IgE  - Allergen lamb's quarter IgE  - Allergen Mugwort IgE  - Allergen ragweed short IgE  - Allergen Sagebrush Wormwood IgE  - Allergen Sheep Sorrel IgE  - Allergen thistle Russian IgE  - Allergen Weed Nettle IgE  - Allergen, Kochia/Firebush  - IgE  - fluticasone (FLONASE) 50 MCG/ACT nasal spray  Dispense: 16 g; Refill: 3  - azelastine (ASTELIN) 0.1 % nasal spray  Dispense: 30 mL; Refill: 3    Follow-up in 10 weeks or sooner if needed.    Thank you for allowing us to participate in the care of this patient. Please feel free to contact us if there are any questions or concerns about the patient.    Disclaimer: This note consists of symbols derived from keyboarding, dictation and/or voice recognition software. As a result, there may be errors in the script that have gone undetected. Please consider this when interpreting information found in this chart.    Oli Sauceda MD, FAAAAI,  SITA  Allergy and Asthma     [facility]

## 2023-11-29 NOTE — LETTER
11/29/2023         RE: Laurie Marquis  [address]  [facility] [address]        Dear Colleague,    Thank you for referring your patient, Laurie Marquis, to the [facility]. Please see a copy of my visit note below.    SUBJECTIVE:                                                                   Laurie Marquis is a 33 year old female who presents today to our Allergy Clinic at [facility]; She is being seen in consultation at the request of Dr. Justin Perrin for an evaluation of allergic rhinitis.     Allergy symptoms    The onset of symptoms: all her life. Used to be Spring and Fall. For the past 2-3 years, became perennial.     Manifested by nasal itch, clear to green rhinorrhea, stuffiness, and sneezing, postnasal drip and ocular symptoms (itching, redness, dryness, and watering).   She is not worse around dogs and cats. Dust exposure will make symptoms worse.    Intranasal steroids have not been used consistently. She tried and failed loratadine. Cetirizine was partially helpful.  Denies using intranasal decongestants.  The severity of symptoms varies from 4 to 10/10.   There is no history of PE tubes, sinus surgeries, or tonsillectomy/adenoidectomy.          Patient Active Problem List   Diagnosis     BRBPR (bright red blood per rectum)     Cervicalgia     Depression with anxiety     Dysmenorrhea     Dyshidrotic eczema     Person with feared complaint in whom no diagnosis was made     S/P laparoscopic sleeve gastrectomy       History reviewed. No pertinent past medical history.   No data available          History reviewed. No pertinent surgical history.  Social History     Socioeconomic History     Marital status: Single     Spouse name: None     Number of children: None     Years of education: None     Highest education level: None   Tobacco Use     Smoking status: Never     Smokeless tobacco: Never     Tobacco comments:     N/A   Substance and Sexual  Activity     Alcohol use: Yes     Comment: Socially     Drug use: Never     Sexual activity: Not Currently     Partners: Male     Birth control/protection: Implant   Other Topics Concern     Parent/sibling w/ CABG, MI or angioplasty before 65F 55M? No   Social History Narrative    ENVIRONMENTAL HISTORY: [place] The home is heated with a electric furnace. They does have central air conditioning. The patient's bedroom is furnished with carpeting in bedroom.  Pets inside the house include 0 . There is no history of cockroach or mice infestation. There is/are 0 smokers in the house.  The house does not have a damp basement.             Review of Systems   Constitutional:  Negative for chills and fever.   HENT:  Positive for congestion, sinus pressure and sinus pain. Negative for ear pain and sore throat.    Eyes:  Negative for pain and visual disturbance.   Respiratory:  Positive for cough. Negative for shortness of breath.    Cardiovascular:  Negative for chest pain and palpitations.   Gastrointestinal:  Negative for abdominal pain and vomiting.   Genitourinary:  Negative for dysuria and hematuria.   Musculoskeletal:  Negative for arthralgias and back pain.   Skin:  Negative for color change and rash.   All other systems reviewed and are negative.          Current Outpatient Medications:      ADDERALL XR 20 MG 24 hr capsule, TAKE 1 CAPSULE BY MOUTH ONCE DAILY ALONG WITH 10MG REGULAR TABLET, Disp: , Rfl:      amphetamine-dextroamphetamine (ADDERALL) 10 MG tablet, TAKE 1 TABLET BY MOUTH ONCE DAILY IN THE MORNING ALONG WITH 20 MG ER FORM, Disp: , Rfl:      azelastine (ASTELIN) 0.1 % nasal spray, Spray 2 sprays into both nostrils 2 times daily as needed for rhinitis, Disp: 30 mL, Rfl: 3     cetirizine (ZYRTEC) 10 MG tablet, Take 1 tablet (10 mg) by mouth daily, Disp: 30 tablet, Rfl: 0     fluticasone (FLONASE) 50 MCG/ACT nasal spray, Spray 2 sprays into both nostrils daily,  Disp: 16 g, Rfl: 3     multivitamin  peds with C and FA (FLINTSTONES/MY FIRST) CHEW, Take 2 tablets by mouth, Disp: , Rfl:   Immunization History   Administered Date(s) Administered     DTAP (<7y) 08/30/1996     Flu, Unspecified 10/15/2013     HPV Quadrivalent 11/13/2006, 03/24/2010, 10/04/2010     Hepatitis B, Peds 02/10/2000, 03/23/2000, 08/22/2000     Hib, Unspecified 01/17/1991, 03/15/1991, 09/23/1991, 01/10/1992     Historic Hib Hib-titer 01/17/1991, 03/15/1991, 09/23/1991, 01/10/1992     Historical DTP/aP 01/17/1991, 03/15/1991, 09/23/1991, 04/25/1992     Influenza (H1N1) 10/04/2010     Influenza (IIV3) PF 10/04/2010, 10/07/2013     Influenza Vaccine >6 months,quad, PF 11/19/2014     Influenza Vaccine, 6+MO IM (QUADRIVALENT W/PRESERVATIVES) 10/04/2010, 10/07/2013     Influenza, seasonal, injectable, PF 11/19/2014     MMR 01/10/1992, 02/10/2000     Meningococcal ACWY (Menactra ) 11/13/2006     Meningococcal ACWY (Menveo ) 11/13/2006     OPV, trivalent, live 01/17/1991, 03/15/1991, 04/24/1992, 08/30/1996     Poliovirus, inactivated (IPV) 09/23/1991, 01/10/1992     TDAP (Adacel,Boostrix) 02/08/2014     Td (Adult), Adsorbed 08/28/2003     Allergies   Allergen Reactions     Lactose Other (See Comments), Diarrhea and GI Disturbance            Seasonal Allergies      OBJECTIVE:                                                                 BP 99/76   Pulse 94   Temp 97.3  F (36.3  C)   Resp 17   Wt 88.5 kg (195 lb)   SpO2 99%         Physical Exam  Vitals and nursing note reviewed.   Constitutional:       General: She is not in acute distress.     Appearance: She is not ill-appearing, toxic-appearing or diaphoretic.   HENT:      Head: Normocephalic and atraumatic.      Right Ear: Tympanic membrane, ear canal and external ear normal.      Left Ear: Tympanic membrane, ear canal and external ear normal.      Nose: Mucosal edema present. No rhinorrhea.      Right Turbinates: Enlarged.      Left Turbinates:  Enlarged.      Mouth/Throat:      Lips: Pink.      Mouth: Mucous membranes are moist.      Pharynx: Oropharynx is clear. No pharyngeal swelling, oropharyngeal exudate, posterior oropharyngeal erythema or uvula swelling.   Eyes:      General:         Right eye: No discharge.         Left eye: No discharge.      Conjunctiva/sclera: Conjunctivae normal.   Cardiovascular:      Rate and Rhythm: Normal rate and regular rhythm.      Heart sounds: Normal heart sounds. No murmur heard.  Pulmonary:      Effort: Pulmonary effort is normal. No respiratory distress.      Breath sounds: Normal breath sounds and air entry. No stridor, decreased air movement or transmitted upper airway sounds. No decreased breath sounds, wheezing, rhonchi or rales.   Musculoskeletal:         General: Normal range of motion.   Skin:     General: Skin is warm.      Capillary Refill: Capillary refill takes less than 2 seconds.      Findings: No rash.   Neurological:      Mental Status: She is alert and oriented to person, place, and time.   Psychiatric:         Mood and Affect: Mood normal.         Behavior: Behavior normal.               WORKUP:     At today's visit, the patient and I engaged in an informed consent discussion about allergy testing.  We discussed skin testing, blood testing, and the alternative of not undergoing any testing. The patient has a preference for skin testing. We then discussed the risks and benefits of skin testing.  The patient understands skin testing risks can include, but are not limited to, urticaria, angioedema, shortness of breath, and severe anaphylaxis.  The benefits include, but are not limited, to evaluation for allergens causing symptoms.  After answering the patient's questions they have agreed to proceed with skin testing.      ENVIRONMENTAL PERCUTANEOUS SKIN TESTING: ADULT      11/29/2023     4:00 PM   [facility]   Consent CHITO   Ordering Physician Sohail   Interpreting Physician Sohail Saleh  Technician RADHA Linares   Location Back   Time start: 16:27   Time End: 16:42   Positive Control: Histatrol*ALK 1 mg/ml 4/20   Negative Control: 50% Glycerin 0   Cat Hair*ALK (10,000 BAU/ml) 0   AP Dog Hair/Dander (1:100 w/v) 0   Dust Mite p. 30,000 AU/ml 0   Dust Mite f. (30,000 AU/ml) 0   Jose (W/F in millimeters) 0   Nelson Grass (100,000 BAU/mL) 0   Red Cedar (W/F in millimeters) 0   Maple/[place] (W/F in millimeters) 0   Hackberry (W/F in millimeters) 0   Yazoo (W/F in millimeters) 0   Morrill *ALK (W/F in millimeters) 0   American Elm (W/F in millimeters) 0   [place] (W/F in millimeters) 0   Black [place] (W/F in millimeters) 0   Birch Mix (W/F in millimeters) 0   [place] (W/F in millimeters) 0   Oak (W/F in millimeters) 0   Cocklebur (W/F in millimeters) 0   [place] (W/F in millimeters) 0   White [place] (W/F in millimeters) 0   Careless (W/F in millimeters) 0   Nettle (W/F in millimeters) 0   English Plantain (W/F in millimeters) 0   Kochia (W/F in millimeters) 0   Lamb's Quarter (W/F in millimeters) 0   Marshelder (W/F in millimeters) 0   Ragweed Mix* ALK (W/F in millimeters) 0   Russian Thistle (W/F in millimeters) 0   Sagebrush/Mugwort (W/F in millimeters) 0   Sheep Sorrel (W/F in millimeters) 0   Feather Mix* ALK (W/F in millimeters) 0   Penicillium Mix (1:10 w/v) 0   Curvularia spicifera (1:10 w/v) 0   Epicoccum (1:10 w/v) 0   Aspergillus fumigatus (1:10 w/v): 0   Alternaria tenius (1:10 w/v) 0   H. Cladosporium (1:10 w/v) 0   Phoma herbarum (1:10 w/v) 0       My interpretation: Percutaneous skin puncture testing for aeroallergens performed today was negative with appropriate responses to positive and negative controls.     ASSESSMENT/PLAN:    Chronic vasomotor rhinitis  Other conjunctivitis of both eyes  Negative percutaneous skin puncture testing for aeroallergens suggesting a lack of sensitivity to tested environmental/seasonal allergens.  Unable to recommend avoidance measures or allergen  immunotherapy.   Since we do not perform intradermal testing, I ordered serum IgE for the regional aeroallergen panel.  Discussed differential with chronic nonallergic rhinitis. Adderall causing rhinitis medicamentosa?  - Use intranasal fluticasone (Flonase) 1-2 sprays in each nostril once daily.  - Add azelastine nasal spray, 2 sprays in each nostril twice daily as needed.  Depending on symptom control, we may consider ordering sinus CT and/or decreasing the dose of Adderall as a trial.   She admits having itchy, dry, and then watery eyes.  - Recommended a trial of artificial tears.    - Allergen cat epithellium IgE  - Allergen dog epithelium IgE  - Allergen Jose grass IgE  - Allergen orchard grass IgE  - Allergen mary alice IgE  - Allergen D farinae IgE  - Allergen D pteronyssinus IgE  - Allergen alternaria alternata IgE  - Allergen aspergillus fumigatus IgE  - Allergen cladosporium herbarum IgE  - Allergen Epicoccum purpurascens IgE  - Allergen penicillium notatum IgE  - Allergen wilber white IgE  - Allergen Cedar IgE  - Allergen cottonwood IgE  - Allergen elm IgE  - Allergen maple box elder IgE  - Allergen oak white IgE  - Allergen Red [place] IgE  - Allergen silver  birch IgE  - Allergen Tree White [place] IgE  - Allergen Niland Tree  - Allergen white pine IgE  - Allergen English plantain IgE  - Allergen giant ragweed IgE  - Allergen lamb's quarter IgE  - Allergen Mugwort IgE  - Allergen ragweed short IgE  - Allergen Sagebrush [place] IgE  - Allergen Sheep Sorrel IgE  - Allergen thistle Russian IgE  - Allergen Weed Nettle IgE  - Allergen, Kochia/Firebush  - IgE  - fluticasone (FLONASE) 50 MCG/ACT nasal spray  Dispense: 16 g; Refill: 3  - azelastine (ASTELIN) 0.1 % nasal spray  Dispense: 30 mL; Refill: 3    Follow-up in 10 weeks or sooner if needed.    Thank you for allowing us to participate in the care of this patient. Please feel free to contact us if there are any questions or concerns about the  patient.    Disclaimer: This note consists of symbols derived from keyboarding, dictation and/or voice recognition software. As a result, there may be errors in the script that have gone undetected. Please consider this when interpreting information found in this chart.    Oli Sauceda MD, FAAAAI, FACAAI  Allergy and Asthma     [facility]         Again, thank you for allowing me to participate in the care of your patient.        Sincerely,        Oli Sauceda MD

## 2023-11-30 LAB
A ALTERNATA IGE QN: <0.1 KU(A)/L
A FUMIGATUS IGE QN: <0.1 KU(A)/L
C HERBARUM IGE QN: <0.1 KU(A)/L
CALIF WALNUT POLN IGE QN: <0.1 KU(A)/L
CAT DANDER IGG QN: <0.1 KU(A)/L
CEDAR IGE QN: <0.1 KU(A)/L
COCKSFOOT IGE QN: <0.1 KU(A)/L
COMMON RAGWEED IGE QN: <0.1 KU(A)/L
COTTONWOOD IGE QN: <0.1 KU(A)/L
D FARINAE IGE QN: <0.1 KU(A)/L
D PTERONYSS IGE QN: <0.1 KU(A)/L
DOG DANDER+EPITH IGE QN: <0.1 KU(A)/L
E PURPURASCENS IGE QN: <0.1 KU(A)/L
EAST WHITE PINE IGE QN: <0.1 KU(A)/L
ENGL PLANTAIN IGE QN: <0.1 KU(A)/L
FIREBUSH IGE QN: <0.1 KU(A)/L
GIANT RAGWEED IGE QN: <0.1 KU(A)/L
GOOSEFOOT IGE QN: <0.1 KU(A)/L
IGE SERPL-ACNC: 25 KU/L (ref 0–114)
JOHNSON GRASS IGE QN: <0.1 KU(A)/L
MAPLE IGE QN: <0.1 KU(A)/L
MUGWORT IGE QN: <0.1 KU(A)/L
NETTLE IGE QN: <0.1 KU(A)/L
P NOTATUM IGE QN: <0.1 KU(A)/L
RED MULBERRY IGE QN: <0.1 KU(A)/L
SALTWORT IGE QN: <0.1 KU(A)/L
SHEEP SORREL IGE QN: <0.1 KU(A)/L
SILVER BIRCH IGE QN: <0.1 KU(A)/L
TIMOTHY IGE QN: <0.1 KU(A)/L
WHITE ASH IGE QN: <0.1 KU(A)/L
WHITE ELM IGE QN: <0.1 KU(A)/L
WHITE MULBERRY IGE QN: <0.1 KU(A)/L
WHITE OAK IGE QN: <0.1 KU(A)/L
WORMWOOD IGE QN: <0.1 KU(A)/L

## 2023-12-05 NOTE — RESULT ENCOUNTER NOTE
[employer] message sent:   Negative serum IgE for regional aeroallergen panel.  The results are similar to negative skin test from November 29.  It suggests lack of sensitivity to tested environmental/seasonal allergens.  Unable to recommend avoidance measures or allergen immunotherapy.  - No changes in the previously discussed treatment plan.

## 2024-02-17 ENCOUNTER — HEALTH MAINTENANCE LETTER (OUTPATIENT)
Age: 34
End: 2024-02-17

## 2024-05-21 ENCOUNTER — OFFICE VISIT (OUTPATIENT)
Dept: ALLERGY | Facility: OTHER | Age: 34
End: 2024-05-21
Payer: COMMERCIAL

## 2024-05-21 VITALS
OXYGEN SATURATION: 99 % | WEIGHT: 154 LBS | RESPIRATION RATE: 16 BRPM | DIASTOLIC BLOOD PRESSURE: 77 MMHG | HEART RATE: 95 BPM | SYSTOLIC BLOOD PRESSURE: 112 MMHG

## 2024-05-21 DIAGNOSIS — L30.9 DERMATITIS: Primary | ICD-10-CM

## 2024-05-21 PROCEDURE — 99214 OFFICE O/P EST MOD 30 MIN: CPT | Performed by: ALLERGY & IMMUNOLOGY

## 2024-05-21 RX ORDER — TRIAMCINOLONE ACETONIDE 1 MG/G
OINTMENT TOPICAL
Qty: 80 G | Refills: 1 | Status: SHIPPED | OUTPATIENT
Start: 2024-05-21

## 2024-05-21 ASSESSMENT — PAIN SCALES - GENERAL: PAINLEVEL: NO PAIN (0)

## 2024-05-21 NOTE — PROGRESS NOTES
SUBJECTIVE:                                                                   Laurie Marquis presents today to our Allergy Clinic at [facility] for a follow up visit. She is a 33 year old female with previous concerns for chronic rhinoconjunctivitis symptoms.  In November 2023, serum IgE for regional aeroallergen panel was negative.  Skin test was negative as well.  The patient states that she used the combination of intranasal fluticasone and azelastine for a couple of weeks, and then her symptoms completely resolved.      History of Present Illness  About 4 weeks ago, the patient developed a pruritic rash on her arms and back.  It happened after she bought some new clothes on line.  It close had very strong smell.  The rash occurred 2 days after trying the clothes.   She sought medical attention at an urgent care facility where hives were documented on exam.  She was treated with oral steroids and cetirizine.  She found oral steroids very helpful, but symptoms relapsed after she stopped oral steroids.  Currently, she takes cetirizine 10 mg by mouth twice daily.  She uses Dial soap.  The rash is very pruritic.  Present on her arms and shoulders.           Patient Active Problem List   Diagnosis    BRBPR (bright red blood per rectum)    Cervicalgia    Depression with anxiety    Dysmenorrhea    Dyshidrotic eczema    Person with feared complaint in whom no diagnosis was made    S/P laparoscopic sleeve gastrectomy       History reviewed. No pertinent past medical history.   No data available          History reviewed. No pertinent surgical history.  Social History     Socioeconomic History    Marital status: Single     Spouse name: None    Number of children: None    Years of education: None    Highest education level: None   Tobacco Use    Smoking status: Never    Smokeless tobacco: Never    Tobacco comments:     N/A   Substance and Sexual Activity    Alcohol use: Yes     Comment: Socially     Drug use: Never    Sexual activity: Not Currently     Partners: Male     Birth control/protection: Implant   Other Topics Concern    Parent/sibling w/ CABG, MI or angioplasty before 65F 55M? No   Social History Narrative    ENVIRONMENTAL HISTORY: [place] The home is heated with a electric furnace. They does have central air conditioning. The patient's bedroom is furnished with carpeting in bedroom.  Pets inside the house include 0 . There is no history of cockroach or mice infestation. There is/are 0 smokers in the house.  The house does not have a damp basement.       Social Determinants of Health      [facility]    Financial Resource Strain    Received from [facility]    Social Connections             Current Outpatient Medications:     ADDERALL XR 20 MG 24 hr capsule, TAKE 1 CAPSULE BY MOUTH ONCE DAILY ALONG WITH 10MG REGULAR TABLET, Disp: , Rfl:     multivitamin  peds with C and FA (FLINTSTONES/MY FIRST) CHEW, Take 2 tablets by mouth, Disp: , Rfl:     triamcinolone (KENALOG) 0.1 % external ointment, Apply sparingly to affected area twice daily as needed not longer than 14 days in a row. Do not apply on face, neck, armpits and groin area., Disp: 80 g, Rfl: 1    amphetamine-dextroamphetamine (ADDERALL) 10 MG tablet, TAKE 1 TABLET BY MOUTH ONCE DAILY IN THE MORNING ALONG WITH 20 MG ER FORM (Patient not taking: Reported on 5/21/2024), Disp: , Rfl:     azelastine (ASTELIN) 0.1 % nasal spray, Spray 2 sprays into both nostrils 2 times daily as needed for rhinitis (Patient not taking: Reported on 5/21/2024), Disp: 30 mL, Rfl: 3    cetirizine (ZYRTEC) 10 MG tablet, Take 1 tablet (10 mg) by mouth daily (Patient not taking: Reported on 5/21/2024), Disp: 30 tablet, Rfl: 0    fluticasone (FLONASE) 50 MCG/ACT nasal spray, Spray 2 sprays into both  nostrils daily (Patient not taking: Reported on 5/21/2024), Disp: 16 g, Rfl: 3  Immunization History   Administered Date(s) Administered    DTAP (<7y) 08/30/1996    Flu, Unspecified 10/15/2013    HIB, Unspecified 01/17/1991, 03/15/1991, 09/23/1991, 01/10/1992    HPV Quadrivalent 11/13/2006, 03/24/2010, 10/04/2010    Hepatitis B, Peds 02/10/2000, 03/23/2000, 08/22/2000    Historic Hib Hib-titer 01/17/1991, 03/15/1991, 09/23/1991, 01/10/1992    Historical DTP/aP 01/17/1991, 03/15/1991, 09/23/1991, 04/25/1992    Influenza (H1N1) 10/04/2010    Influenza (IIV3) PF 10/04/2010, 10/07/2013    Influenza Vaccine >6 months,quad, PF 11/19/2014    Influenza Vaccine, 6+MO IM (QUADRIVALENT W/PRESERVATIVES) 10/04/2010, 10/07/2013    Influenza, seasonal, injectable, PF 11/19/2014    MMR 01/10/1992, 02/10/2000    Meningococcal ACWY (Menactra ) 11/13/2006    Meningococcal ACWY (Menveo ) 11/13/2006    OPV, trivalent, live 01/17/1991, 03/15/1991, 04/24/1992, 08/30/1996    Poliovirus, inactivated (IPV) 09/23/1991, 01/10/1992    TDAP (Adacel,Boostrix) 02/08/2014    Td (Adult), Adsorbed 08/28/2003     Allergies   Allergen Reactions    Lactose Other (See Comments), Diarrhea and GI Disturbance           Seasonal Allergies      OBJECTIVE:                                                                 /77   Pulse 95   Resp 16   Wt 69.9 kg (154 lb)   SpO2 99%         Physical Exam  Vitals and nursing note reviewed.   Constitutional:       General: She is not in acute distress.     Appearance: She is not ill-appearing, toxic-appearing or diaphoretic.   HENT:      Head: Normocephalic and atraumatic.      Right Ear: Tympanic membrane, ear canal and external ear normal.      Left Ear: Tympanic membrane, ear canal and external ear normal.      Nose: No mucosal edema or rhinorrhea.      Right Turbinates: Not enlarged.      Left Turbinates: Not enlarged.      Mouth/Throat:      Lips: Pink.      Mouth: Mucous membranes are moist.       Pharynx: Oropharynx is clear. No pharyngeal swelling, oropharyngeal exudate, posterior oropharyngeal erythema or uvula swelling.   Eyes:      General:         Right eye: No discharge.         Left eye: No discharge.      Conjunctiva/sclera: Conjunctivae normal.   Cardiovascular:      Rate and Rhythm: Normal rate and regular rhythm.      Heart sounds: Normal heart sounds. No murmur heard.  Pulmonary:      Effort: Pulmonary effort is normal. No respiratory distress.      Breath sounds: Normal breath sounds and air entry. No stridor, decreased air movement or transmitted upper airway sounds. No decreased breath sounds, wheezing, rhonchi or rales.   Musculoskeletal:         General: Normal range of motion.   Skin:     General: Skin is warm.      Comments: Diffuse erythema on both arms and shoulders, with dermatographism and postinflammatory hyperpigmentation.   Neurological:      Mental Status: She is alert and oriented to person, place, and time.   Psychiatric:         Mood and Affect: Mood normal.         Behavior: Behavior normal.             ASSESSMENT/PLAN:    Assessment & Plan  1.  Dermatitis  At this point, I favor contact dermatitis as the most plausible etiology of the symptoms. Besides dermatographic lesions, I do not observe any urticarial lesions.  - Increase cetirizine to 20 mg by mouth twice daily to help with pruritus.  - Start bleach baths twice a week.  - Reviewed skin care regimen with the patient: Eliminate harsh soaps such as Dial, Zest, and [place] Spring; use mild soaps like Cetaphil or Dove Sensitive Skin. Avoid hot or cold showers, and apply moisturizers aggressively, including Vanicream, Cetaphil, or Aquaphor.  - Start triamcinolone 0.1%: Apply sparingly to the affected area twice daily as needed, but not for more than 14 consecutive days. Avoid applying to the face, armpits, neck, and groin.  She will send me a message in 2 weeks if she continues to have symptoms. A prescription for betamethasone  topically may be necessary.  She has an appointment with Dermatology in July. I recommend discussing a patch test with them.    - triamcinolone (KENALOG) 0.1 % external ointment  Dispense: 80 g; Refill: 1       Follow-up as needed.    Thank you for allowing us to participate in the care of this patient. Please feel free to contact us if there are any questions or concerns about the patient.    Disclaimer: This note consists of symbols derived from keyboarding, dictation and/or voice recognition software. As a result, there may be errors in the script that have gone undetected. Please consider this when interpreting information found in this chart.    Consent was obtained from the patient to use an AI documentation tool in the creation of this note.     Oli Sauceda MD, FAAAAI, FACAAI  Allergy, Asthma and Immunology     [facility]

## 2024-05-21 NOTE — LETTER
5/21/2024         RE: Laurie Marquis  [address]  [facility] [address]        Dear Colleague,    Thank you for referring your patient, Laurie Marquis, to the [facility]. Please see a copy of my visit note below.    SUBJECTIVE:                                                                   Laurie Marquis presents today to our Allergy Clinic at [facility] for a follow up visit. She is a 33 year old female with previous concerns for chronic rhinoconjunctivitis symptoms.  In November 2023, serum IgE for regional aeroallergen panel was negative.  Skin test was negative as well.  The patient states that she used the combination of intranasal fluticasone and azelastine for a couple of weeks, and then her symptoms completely resolved.      History of Present Illness  About 4 weeks ago, the patient developed a pruritic rash on her arms and back.  It happened after she bought some new clothes on line.  It close had very strong smell.  The rash occurred 2 days after trying the clothes.   She sought medical attention at an urgent care facility where hives were documented on exam.  She was treated with oral steroids and cetirizine.  She found oral steroids very helpful, but symptoms relapsed after she stopped oral steroids.  Currently, she takes cetirizine 10 mg by mouth twice daily.  She uses Dial soap.  The rash is very pruritic.  Present on her arms and shoulders.           Patient Active Problem List   Diagnosis     BRBPR (bright red blood per rectum)     Cervicalgia     Depression with anxiety     Dysmenorrhea     Dyshidrotic eczema     Person with feared complaint in whom no diagnosis was made     S/P laparoscopic sleeve gastrectomy       History reviewed. No pertinent past medical history.   No data available          History reviewed. No pertinent surgical history.  Social History     Socioeconomic History     Marital status: Single     Spouse name: None      Number of children: None     Years of education: None     Highest education level: None   Tobacco Use     Smoking status: Never     Smokeless tobacco: Never     Tobacco comments:     N/A   Substance and Sexual Activity     Alcohol use: Yes     Comment: Socially     Drug use: Never     Sexual activity: Not Currently     Partners: Male     Birth control/protection: Implant   Other Topics Concern     Parent/sibling w/ CABG, MI or angioplasty before 65F 55M? No   Social History Narrative    ENVIRONMENTAL HISTORY: [place] The home is heated with a electric furnace. They does have central air conditioning. The patient's bedroom is furnished with carpeting in bedroom.  Pets inside the house include 0 . There is no history of cockroach or mice infestation. There is/are 0 smokers in the house.  The house does not have a damp basement.       Social Determinants of Health      [facility], [employer] [facility]    Financial Resource Strain    [facility]    Social Connections             Current Outpatient Medications:      ADDERALL XR 20 MG 24 hr capsule, TAKE 1 CAPSULE BY MOUTH ONCE DAILY ALONG WITH 10MG REGULAR TABLET, Disp: , Rfl:      multivitamin  peds with C and FA (FLINTSTONES/MY FIRST) CHEW, Take 2 tablets by mouth, Disp: , Rfl:      triamcinolone (KENALOG) 0.1 % external ointment, Apply sparingly to affected area twice daily as needed not longer than 14 days in a row. Do not apply on face, neck, armpits and groin area., Disp: 80 g, Rfl: 1     amphetamine-dextroamphetamine (ADDERALL) 10 MG tablet, TAKE 1 TABLET BY MOUTH ONCE DAILY IN THE MORNING ALONG WITH 20 MG ER FORM (Patient not taking: Reported on 5/21/2024), Disp: , Rfl:      azelastine (ASTELIN) 0.1 % nasal spray, Spray 2 sprays into both nostrils 2 times daily as needed for rhinitis (Patient not  taking: Reported on 5/21/2024), Disp: 30 mL, Rfl: 3     cetirizine (ZYRTEC) 10 MG tablet, Take 1 tablet (10 mg) by mouth daily (Patient not taking: Reported on 5/21/2024), Disp: 30 tablet, Rfl: 0     fluticasone (FLONASE) 50 MCG/ACT nasal spray, Spray 2 sprays into both nostrils daily (Patient not taking: Reported on 5/21/2024), Disp: 16 g, Rfl: 3  Immunization History   Administered Date(s) Administered     DTAP (<7y) 08/30/1996     Flu, Unspecified 10/15/2013     HIB, Unspecified 01/17/1991, 03/15/1991, 09/23/1991, 01/10/1992     HPV Quadrivalent 11/13/2006, 03/24/2010, 10/04/2010     Hepatitis B, Peds 02/10/2000, 03/23/2000, 08/22/2000     Historic Hib Hib-titer 01/17/1991, 03/15/1991, 09/23/1991, 01/10/1992     Historical DTP/aP 01/17/1991, 03/15/1991, 09/23/1991, 04/25/1992     Influenza (H1N1) 10/04/2010     Influenza (IIV3) PF 10/04/2010, 10/07/2013     Influenza Vaccine >6 months,quad, PF 11/19/2014     Influenza Vaccine, 6+MO IM (QUADRIVALENT W/PRESERVATIVES) 10/04/2010, 10/07/2013     Influenza, seasonal, injectable, PF 11/19/2014     MMR 01/10/1992, 02/10/2000     Meningococcal ACWY (Menactra ) 11/13/2006     Meningococcal ACWY (Menveo ) 11/13/2006     OPV, trivalent, live 01/17/1991, 03/15/1991, 04/24/1992, 08/30/1996     Poliovirus, inactivated (IPV) 09/23/1991, 01/10/1992     TDAP (Adacel,Boostrix) 02/08/2014     Td (Adult), Adsorbed 08/28/2003     Allergies   Allergen Reactions     Lactose Other (See Comments), Diarrhea and GI Disturbance            Seasonal Allergies      OBJECTIVE:                                                                 /77   Pulse 95   Resp 16   Wt 69.9 kg (154 lb)   SpO2 99%         Physical Exam  Vitals and nursing note reviewed.   Constitutional:       General: She is not in acute distress.     Appearance: She is not ill-appearing, toxic-appearing or diaphoretic.   HENT:      Head: Normocephalic and atraumatic.      Right Ear: Tympanic membrane, ear canal and  external ear normal.      Left Ear: Tympanic membrane, ear canal and external ear normal.      Nose: No mucosal edema or rhinorrhea.      Right Turbinates: Not enlarged.      Left Turbinates: Not enlarged.      Mouth/Throat:      Lips: Pink.      Mouth: Mucous membranes are moist.      Pharynx: Oropharynx is clear. No pharyngeal swelling, oropharyngeal exudate, posterior oropharyngeal erythema or uvula swelling.   Eyes:      General:         Right eye: No discharge.         Left eye: No discharge.      Conjunctiva/sclera: Conjunctivae normal.   Cardiovascular:      Rate and Rhythm: Normal rate and regular rhythm.      Heart sounds: Normal heart sounds. No murmur heard.  Pulmonary:      Effort: Pulmonary effort is normal. No respiratory distress.      Breath sounds: Normal breath sounds and air entry. No stridor, decreased air movement or transmitted upper airway sounds. No decreased breath sounds, wheezing, rhonchi or rales.   Musculoskeletal:         General: Normal range of motion.   Skin:     General: Skin is warm.      Comments: Diffuse erythema on both arms and shoulders, with dermatographism and postinflammatory hyperpigmentation.   Neurological:      Mental Status: She is alert and oriented to person, place, and time.   Psychiatric:         Mood and Affect: Mood normal.         Behavior: Behavior normal.             ASSESSMENT/PLAN:    Assessment & Plan  1.  Dermatitis  At this point, I favor contact dermatitis as the most plausible etiology of the symptoms. Besides dermatographic lesions, I do not observe any urticarial lesions.  - Increase cetirizine to 20 mg by mouth twice daily to help with pruritus.  - Start bleach baths twice a week.  - Reviewed skin care regimen with the patient: Eliminate harsh soaps such as Dial, Zest, and Deedee Spring; use mild soaps like Cetaphil or Dove Sensitive Skin. Avoid hot or cold showers, and apply moisturizers aggressively, including Vanicream, Cetaphil, or Aquaphor.  -  Start triamcinolone 0.1%: Apply sparingly to the affected area twice daily as needed, but not for more than 14 consecutive days. Avoid applying to the face, armpits, neck, and groin.  She will send me a message in 2 weeks if she continues to have symptoms. A prescription for betamethasone topically may be necessary.  She has an appointment with Dermatology in July. I recommend discussing a patch test with them.    - triamcinolone (KENALOG) 0.1 % external ointment  Dispense: 80 g; Refill: 1       Follow-up as needed.    Thank you for allowing us to participate in the care of this patient. Please feel free to contact us if there are any questions or concerns about the patient.    Disclaimer: This note consists of symbols derived from keyboarding, dictation and/or voice recognition software. As a result, there may be errors in the script that have gone undetected. Please consider this when interpreting information found in this chart.    Consent was obtained from the patient to use an AI documentation tool in the creation of this note.     Oli Sauceda MD, FAAAAI, FACAAI  Allergy, Asthma and Immunology     [facility]        Again, thank you for allowing me to participate in the care of your patient.        Sincerely,        Oli Sauceda MD

## 2024-05-21 NOTE — PATIENT INSTRUCTIONS
Increase cetirizine to 20 mg by mouth twice daily.    Liquid chlorine bleach (sodium hypochlorite) 6% 0.5 cup in full bathtub (40 gallons) x 5-10 minutes twice a week.  Rinse off afterwards then emollient (heavy creams with little water like Nutraderm, Eucerin and Cetaphil) or ointments with no water (petrolatum jelly, Vaseline, Aquaphor, Lubriderm).    Eliminate harsh soaps, i.e., Dial, zest, [place]awk spring;  Use mild soaps such as Cetaphil or Dove sensitive skin, avoid hot or cold showers, aggressive use of moisturizers including Vanicream, Cetaphil or Aquaphor.     The average pH level (acidity or alkaline) of soap is 9 to 10. The skin s normal pH level is 4 to 5. Because of this difference, soap increases the skin s pH to an undesirable level and can worsen skin dryness.  It is best to use a non-soap cleanser because they are usually free of sodium lauryl sulfate. This chemical creates soap s foaming action and can irritate skin. Examples of non-soap cleansers include Dove  Sensitive Skin Unscented Beauty Bar, Aquaphor  Gentle Wash, AVEENO  Advanced Care Wash, Basis  Sensitive Skin Bar, CeraVe  Hydrating Cleanser, and Cetaphil  Gentle Cleansing Bar.   Daily baths, followed by moisturizers.     Triamcinolone 0.1: Apply sparingly to affected area two times daily as needed but not more than 14 days in a row. Spare face, armpits, neck, and groin.       Stop after you feel better.      See Dermatology and ask about patch test.

## 2024-09-29 ENCOUNTER — OFFICE VISIT (OUTPATIENT)
Dept: URGENT CARE | Facility: URGENT CARE | Age: 34
End: 2024-09-29
Payer: COMMERCIAL

## 2024-09-29 VITALS
OXYGEN SATURATION: 100 % | SYSTOLIC BLOOD PRESSURE: 111 MMHG | TEMPERATURE: 104 F | HEART RATE: 127 BPM | WEIGHT: 163.4 LBS | DIASTOLIC BLOOD PRESSURE: 77 MMHG | RESPIRATION RATE: 20 BRPM

## 2024-09-29 DIAGNOSIS — R42 LIGHTHEADEDNESS: ICD-10-CM

## 2024-09-29 DIAGNOSIS — E86.0 DEHYDRATION: ICD-10-CM

## 2024-09-29 DIAGNOSIS — R50.9 FEVER, UNSPECIFIED FEVER CAUSE: Primary | ICD-10-CM

## 2024-09-29 DIAGNOSIS — R19.7 DIARRHEA, UNSPECIFIED TYPE: ICD-10-CM

## 2024-09-29 LAB
FLUAV AG SPEC QL IA: NEGATIVE
FLUBV AG SPEC QL IA: NEGATIVE

## 2024-09-29 PROCEDURE — 87635 SARS-COV-2 COVID-19 AMP PRB: CPT | Performed by: PHYSICIAN ASSISTANT

## 2024-09-29 PROCEDURE — 87804 INFLUENZA ASSAY W/OPTIC: CPT | Performed by: PHYSICIAN ASSISTANT

## 2024-09-29 PROCEDURE — 99214 OFFICE O/P EST MOD 30 MIN: CPT | Performed by: PHYSICIAN ASSISTANT

## 2024-09-29 RX ORDER — ACETAMINOPHEN 325 MG/1
650 TABLET ORAL ONCE
Status: COMPLETED | OUTPATIENT
Start: 2024-09-29 | End: 2024-09-29

## 2024-09-29 RX ADMIN — ACETAMINOPHEN 650 MG: 325 TABLET ORAL at 13:02

## 2024-09-29 NOTE — PROGRESS NOTES
Chief Complaint   Patient presents with    Urgent Care    Chills     Chills, body aches, loose stool, fatigue, temp was 103 yesterday, sx since Friday, it started first with the runny nose and sneezing, then the next day with other sx, taking Theraflu for the past few days but not today    Diarrhea    Fever    Fatigue           Results for orders placed or performed in visit on 09/29/24   Influenza A & B Antigen - Clinic Collect     Status: Normal    Specimen: Nose; Swab   Result Value Ref Range    Influenza A antigen Negative Negative    Influenza B antigen Negative Negative    Narrative    Test results must be correlated with clinical data. If necessary, results should be confirmed by a molecular assay or viral culture.               ASSESSMENT:     ICD-10-CM    1. Fever, unspecified fever cause  R50.9 Symptomatic COVID-19 Virus (Coronavirus) by PCR Nose     Influenza A & B Antigen - Clinic Collect     acetaminophen (TYLENOL) tablet 650 mg      2. Diarrhea, unspecified type  R19.7       3. Lightheadedness  R42       4. Dehydration  E86.0             PLAN:Diarrhea for 48 hours.  Fever 103 yesterday, 104 today.  650 mg of Tylenol given here approximately 1:05 PM.  Negative for influenza A and B.  COVID test pending.  Lightheaded, very weak and fatigued.  Limited on resources here.  To ER for further evaluation and treatment.  I have discussed clinical findings with patient.  Patient care instructions are discussed/given at the end of visit.     Ariela Cope PA-C      SUBJECTIVE:  33-year-old female presents with chills, body aches, nonstop diarrhea and fatigue for the last 48 hours.  Temp yesterday 103.  Has had gastric sleeve in the past told not to use ibuprofen.  Had cholecystectomy June 2024.  Has tried TheraFlu.  No sore throat.  No blood in her stools.  Does feel lightheaded.  No cough.    Allergies   Allergen Reactions    Lactose Other (See Comments), Diarrhea and GI Disturbance           Seasonal  Allergies        No past medical history on file.    Current Outpatient Medications   Medication Sig Dispense Refill    amphetamine-dextroamphetamine (ADDERALL) 10 MG tablet       ADDERALL XR 20 MG 24 hr capsule TAKE 1 CAPSULE BY MOUTH ONCE DAILY ALONG WITH 10MG REGULAR TABLET (Patient not taking: Reported on 9/29/2024)      azelastine (ASTELIN) 0.1 % nasal spray Spray 2 sprays into both nostrils 2 times daily as needed for rhinitis (Patient not taking: Reported on 5/21/2024) 30 mL 3    cetirizine (ZYRTEC) 10 MG tablet Take 1 tablet (10 mg) by mouth daily (Patient not taking: Reported on 5/21/2024) 30 tablet 0    fluticasone (FLONASE) 50 MCG/ACT nasal spray Spray 2 sprays into both nostrils daily (Patient not taking: Reported on 5/21/2024) 16 g 3    multivitamin  peds with C and FA (FLINTSTONES/MY FIRST) CHEW Take 2 tablets by mouth (Patient not taking: Reported on 9/29/2024)      triamcinolone (KENALOG) 0.1 % external ointment Apply sparingly to affected area twice daily as needed not longer than 14 days in a row. Do not apply on face, neck, armpits and groin area. (Patient not taking: Reported on 9/29/2024) 80 g 1     No current facility-administered medications for this visit.       Social History     Tobacco Use    Smoking status: Never    Smokeless tobacco: Never    Tobacco comments:     N/A   Substance Use Topics    Alcohol use: Yes     Comment: Socially       ROS:  CONSTITUTIONAL: Negative for fatigue or fever.  EYES: Negative for eye problems.  ENT: As above.  RESP: As above.  CV: Negative for chest pains.  GI: Negative for vomiting.  MUSCULOSKELETAL:  Negative for significant muscle or joint pains.  NEUROLOGIC: Negative for headaches.  SKIN: Negative for rash.  PSYCH: Normal mentation for age.    OBJECTIVE:  /77 (BP Location: Right arm, Patient Position: Sitting, Cuff Size: Adult Large)   Pulse (!) 127   Temp (!) 104  F (40  C) (Tympanic)   Resp 20   Wt 74.1 kg (163 lb 6.4 oz)   SpO2 100%    GENERAL APPEARANCE: Looks ill, mildly toxic .  EYES:Conjunctiva/sclera clear.  EARS: No cerumen.   Ear canals w/o erythema.  TM's intact w/o erythema.    THROAT: No erythema w/o tonsillar enlargement . No exudates.  NECK: Supple, nontender, no lymphadenopathy.  RESP: Lungs clear to auscultation - no rales, rhonchi or wheezes  CV: Regular rate and rhythm, normal S1 S2, no murmur noted.  NEURO: Awake, alert    SKIN: No rashes  Abdomen: Normal active bowel sounds.  Soft, nontender.    Ariela Cope PA-C

## 2024-09-29 NOTE — PATIENT INSTRUCTIONS
Diarrhea for 48 hours.  Fever 103 yesterday, 104 today.  650 mg of Tylenol given here approximately 1:05 PM.  Negative for influenza A and B.  COVID test pending.  Lightheaded, very weak and fatigued.  Limited on resources here.  To ER for further evaluation and treatment.

## 2024-09-29 NOTE — NURSING NOTE
Clinic Administered Medication Documentation    Patient was given Tylenol. Prior to medication administration, verified patient's identity using patient's name and date of birth.    Irving Escobar CMA

## 2024-09-30 LAB — SARS-COV-2 RNA RESP QL NAA+PROBE: NEGATIVE

## 2024-10-15 ENCOUNTER — OFFICE VISIT (OUTPATIENT)
Dept: URGENT CARE | Facility: URGENT CARE | Age: 34
End: 2024-10-15
Payer: COMMERCIAL

## 2024-10-15 VITALS
WEIGHT: 159.4 LBS | DIASTOLIC BLOOD PRESSURE: 70 MMHG | OXYGEN SATURATION: 97 % | SYSTOLIC BLOOD PRESSURE: 106 MMHG | HEART RATE: 102 BPM | TEMPERATURE: 99.9 F | RESPIRATION RATE: 18 BRPM

## 2024-10-15 DIAGNOSIS — J06.9 VIRAL UPPER RESPIRATORY ILLNESS: Primary | ICD-10-CM

## 2024-10-15 DIAGNOSIS — R07.0 THROAT PAIN: ICD-10-CM

## 2024-10-15 DIAGNOSIS — R50.9 FEVER, UNSPECIFIED FEVER CAUSE: ICD-10-CM

## 2024-10-15 DIAGNOSIS — R09.81 NASAL CONGESTION: ICD-10-CM

## 2024-10-15 LAB — DEPRECATED S PYO AG THROAT QL EIA: NEGATIVE

## 2024-10-15 PROCEDURE — 99213 OFFICE O/P EST LOW 20 MIN: CPT | Performed by: PHYSICIAN ASSISTANT

## 2024-10-15 PROCEDURE — 87635 SARS-COV-2 COVID-19 AMP PRB: CPT | Performed by: PHYSICIAN ASSISTANT

## 2024-10-15 PROCEDURE — 87651 STREP A DNA AMP PROBE: CPT | Performed by: PHYSICIAN ASSISTANT

## 2024-10-16 LAB — GROUP A STREP BY PCR: NOT DETECTED

## 2024-10-16 NOTE — PROGRESS NOTES
Chief Complaint   Patient presents with    Covid 19 Testing     Runny nose and fever - onset since the weekend, was exposed to somebody who tested positive for Covid        Results for orders placed or performed in visit on 10/15/24   Streptococcus A Rapid Screen w/Reflex to PCR - Clinic Collect     Status: Normal    Specimen: Throat; Swab   Result Value Ref Range    Group A Strep antigen Negative Negative                 ASSESSMENT:     ICD-10-CM    1. Viral upper respiratory illness  J06.9       2. Fever, unspecified fever cause  R50.9 Streptococcus A Rapid Screen w/Reflex to PCR - Clinic Collect     Symptomatic COVID-19 Virus (Coronavirus) by PCR Nose     Group A Streptococcus PCR Throat Swab      3. Nasal congestion  R09.81       4. Throat pain  R07.0             PLAN:  I have discussed clinical findings with patient.  Symptomatic care is discussed.  I have discussed the possibility of  worsening symptoms and indication to RTC or go to the ER if they occur.  All questions are answered, patient indicates understanding of these issues and is in agreement with plan.   Patient care instructions are discussed/given at the end of visit.   Lots of rest and fluids.      Ariela Cope PA-C      SUBJECTIVE:  33-year-old female presents for scratchy throat, runny nose, fever, onset 3 days ago.  Was with a group of 10 girlfriends in [place].  One of them tested positive for COVID.  Had an  home COVID test that was positive but she wants testing done here tonight.  No vomiting or diarrhea.      Allergies   Allergen Reactions    Lactose Other (See Comments), Diarrhea and GI Disturbance           Seasonal Allergies        No past medical history on file.    Current Outpatient Medications   Medication Sig Dispense Refill    ADDERALL XR 20 MG 24 hr capsule TAKE 1 CAPSULE BY MOUTH ONCE DAILY ALONG WITH 10MG REGULAR TABLET (Patient not taking: Reported on 2024)      amphetamine-dextroamphetamine (ADDERALL) 10  MG tablet       azelastine (ASTELIN) 0.1 % nasal spray Spray 2 sprays into both nostrils 2 times daily as needed for rhinitis (Patient not taking: Reported on 5/21/2024) 30 mL 3    cetirizine (ZYRTEC) 10 MG tablet Take 1 tablet (10 mg) by mouth daily (Patient not taking: Reported on 5/21/2024) 30 tablet 0    fluticasone (FLONASE) 50 MCG/ACT nasal spray Spray 2 sprays into both nostrils daily (Patient not taking: Reported on 5/21/2024) 16 g 3    multivitamin  peds with C and FA (FLINTSTONES/MY FIRST) CHEW Take 2 tablets by mouth (Patient not taking: Reported on 9/29/2024)      triamcinolone (KENALOG) 0.1 % external ointment Apply sparingly to affected area twice daily as needed not longer than 14 days in a row. Do not apply on face, neck, armpits and groin area. (Patient not taking: Reported on 9/29/2024) 80 g 1     No current facility-administered medications for this visit.       Social History     Tobacco Use    Smoking status: Never    Smokeless tobacco: Never    Tobacco comments:     N/A   Substance Use Topics    Alcohol use: Yes     Comment: Socially       ROS:  CONSTITUTIONAL: Negative for fatigue or fever.  EYES: Negative for eye problems.  ENT: As above.  RESP: As above.  CV: Negative for chest pains.  GI: Negative for vomiting.  MUSCULOSKELETAL:  Negative for significant muscle or joint pains.  NEUROLOGIC: Negative for headaches.  SKIN: Negative for rash.  PSYCH: Normal mentation for age.    OBJECTIVE:  /70 (BP Location: Left arm, Patient Position: Sitting, Cuff Size: Adult Regular)   Pulse 102   Temp 99.9  F (37.7  C) (Tympanic)   Resp 18   Wt 72.3 kg (159 lb 6.4 oz)   LMP 10/01/2024 (Approximate)   SpO2 97%   GENERAL APPEARANCE: Healthy, alert and no distress.  EYES:Conjunctiva/sclera clear.  EARS: No cerumen.   Ear canals w/o erythema.  TM's intact w/o erythema.    THROAT: Mild erythema  erythema w/o tonsillar enlargement . No exudates.  NECK: Supple, nontender, no lymphadenopathy.  RESP:  Lungs clear to auscultation - no rales, rhonchi or wheezes  CV: Regular rate and rhythm, normal S1 S2, no murmur noted.  NEURO: Awake, alert    SKIN: No rashes      Ariela Cope PA-C

## 2024-10-17 LAB — SARS-COV-2 RNA RESP QL NAA+PROBE: POSITIVE

## 2024-10-18 ENCOUNTER — TELEPHONE (OUTPATIENT)
Dept: NURSING | Facility: CLINIC | Age: 34
End: 2024-10-18
Payer: COMMERCIAL

## 2024-10-18 NOTE — TELEPHONE ENCOUNTER
Patient classified as COVID treatment eligible by Epic high risk algorithm:  Yes    Coronavirus (COVID-19) Notification    Reason for call  Notify of POSITIVE COVID-19 lab result, assess symptoms,  review [facility] recommendations    Lab Result   Lab test for 2019-nCoV rRt-PCR or SARS-COV-2 PCR  Oropharyngeal AND/OR nasopharyngeal swabs were POSITIVE for 2019-nCoV RNA [OR] SARS-COV-2 RNA (COVID-19) RNA     We have been unable to reach patient by phone at this time to notify of their Positive COVID-19 result.    Left voicemail message requesting a call back to [phone] [facility] for results.        A Positive COVID-19 letter will be sent via [employer] or the mail.    Sandra Ryder

## 2024-12-30 ENCOUNTER — VIRTUAL VISIT (OUTPATIENT)
Dept: FAMILY MEDICINE | Facility: CLINIC | Age: 34
End: 2024-12-30
Payer: COMMERCIAL

## 2024-12-30 DIAGNOSIS — R22.1 NECK SWELLING: Primary | ICD-10-CM

## 2024-12-30 DIAGNOSIS — L85.8 KERATOSIS PILARIS: ICD-10-CM

## 2024-12-30 PROCEDURE — 99213 OFFICE O/P EST LOW 20 MIN: CPT | Mod: 95

## 2024-12-30 NOTE — PROGRESS NOTES
Laurie is a 34 year old who is being evaluated via a billable video visit.    How would you like to obtain your AVS? [employer]  If the video visit is dropped, the invitation should be resent by: Text to cell phone: [phone]  Will anyone else be joining your video visit? No    If patient has telephone visit, have they been educated on video visit as preferred visit method and offered to change to video visit? NOT APPLICABLE    Instructions Relayed to Patient by Virtual Roomer:     Patient Confirmed they will join visit via: [employer]  Reminded patient to ensure they were logged on to virtual visit by arrival time listed.   Asked if patient has flexibility to initiate visit sooner than arrival time: patient is unable to initiate visit earlier than arrival time     If pediatric virtual visit, ensured pediatric patient along with parent/guardian will be present for video visit.     Patient offered the website [email or web address] and/or phone number to [employer] Help line: [phone]     Assessment & Plan     Neck swelling  - Persistent right-sided neck swelling, has previously seen ENT and allergist in the past. Treatments for allergies have not yielded results.   - Will re-refer to ENT for further evaluation and treatment.     Keratosis pilaris  - Distributed on bilateral posterior arms, legs.  - Salicylic Acid-Urea 5-10 % OINT- apply nightly, follow with moisturizer  - Follow up in 4-6 weeks.     Subjective   Laurie is a 34 year old, presenting for the following health issues:  lump under chin       12/30/2024     2:36 PM   Additional Questions   Roomed by Riggs   Accompanied by self         12/30/2024     2:36 PM   Patient Reported Additional Medications   Patient reports taking the following new medications No     Video Start Time: 5:05 PM    History of Present Illness       Reason for visit:  Lump under chin and referral to ENT   She is taking medications regularly.     Swelling on the side of her  neck. Intermittent, with itching to ear and back of her tongue. Intermittent swelling. Saw ENT, sent to allergist, allergy testing wasn't beneficial. Stopped using nasal sprays after they weren't beneficial. No pain or redness. Usually swells for hours to a day at a time. No upper respiratory illness, runny nose, sore throat, cough. Cetirizine, benadryl not helpful.     Flares up at least once per week, at least every 3 days. Symptoms have been happening consistently for the past year and a half.     Dad passed away of thymic cancer this year.     Review of Systems  Constitutional, HEENT, cardiovascular, pulmonary, gi and gu systems are negative, except as otherwise noted.      Objective       Vitals:  No vitals were obtained today due to virtual visit.    Physical Exam   GENERAL: alert and no distress  EYES: Eyes grossly normal to inspection.  No discharge or erythema, or obvious scleral/conjunctival abnormalities.  RESP: No audible wheeze, cough, or visible cyanosis.    SKIN: Visible skin clear. No significant rash, abnormal pigmentation or lesions.  NEURO: Cranial nerves grossly intact.  Mentation and speech appropriate for age.  PSYCH: Appropriate affect, tone, and pace of words      Video-Visit Details    Type of service:  Video Visit   Video End Time:5:30 PM  Originating Location (pt. Location): Home  Distant Location (provider location):  On-site  Platform used for Video Visit: Pad[employer]    Signed Electronically by: DEANA Blake CNP

## 2025-03-08 ENCOUNTER — HEALTH MAINTENANCE LETTER (OUTPATIENT)
Age: 35
End: 2025-03-08